# Patient Record
Sex: FEMALE | Race: BLACK OR AFRICAN AMERICAN | NOT HISPANIC OR LATINO | Employment: FULL TIME | URBAN - METROPOLITAN AREA
[De-identification: names, ages, dates, MRNs, and addresses within clinical notes are randomized per-mention and may not be internally consistent; named-entity substitution may affect disease eponyms.]

---

## 2017-01-25 ENCOUNTER — OFFICE VISIT (OUTPATIENT)
Dept: URGENT CARE | Facility: CLINIC | Age: 37
End: 2017-01-25
Payer: COMMERCIAL

## 2017-01-25 VITALS
OXYGEN SATURATION: 98 % | SYSTOLIC BLOOD PRESSURE: 112 MMHG | HEART RATE: 89 BPM | RESPIRATION RATE: 18 BRPM | TEMPERATURE: 99 F | DIASTOLIC BLOOD PRESSURE: 70 MMHG | BODY MASS INDEX: 31.58 KG/M2 | HEIGHT: 67 IN | WEIGHT: 201.19 LBS

## 2017-01-25 DIAGNOSIS — M79.10 MYALGIA: ICD-10-CM

## 2017-01-25 DIAGNOSIS — Z72.0 TOBACCO ABUSE: ICD-10-CM

## 2017-01-25 DIAGNOSIS — J32.9 SINUSITIS, UNSPECIFIED CHRONICITY, UNSPECIFIED LOCATION: Primary | ICD-10-CM

## 2017-01-25 LAB
CTP QC/QA: YES
FLUAV AG NPH QL: NEGATIVE
FLUBV AG NPH QL: NEGATIVE

## 2017-01-25 PROCEDURE — 1159F MED LIST DOCD IN RCRD: CPT | Mod: S$GLB,,, | Performed by: NURSE PRACTITIONER

## 2017-01-25 PROCEDURE — 99214 OFFICE O/P EST MOD 30 MIN: CPT | Mod: S$GLB,,, | Performed by: NURSE PRACTITIONER

## 2017-01-25 PROCEDURE — 87804 INFLUENZA ASSAY W/OPTIC: CPT | Mod: QW,S$GLB,, | Performed by: NURSE PRACTITIONER

## 2017-01-25 PROCEDURE — 99999 PR PBB SHADOW E&M-EST. PATIENT-LVL III: CPT | Mod: PBBFAC,,, | Performed by: NURSE PRACTITIONER

## 2017-01-25 RX ORDER — OXYCODONE AND ACETAMINOPHEN 10; 325 MG/1; MG/1
TABLET ORAL
Refills: 0 | COMMUNITY
Start: 2017-01-19

## 2017-01-25 RX ORDER — DOXYCYCLINE 100 MG/1
100 CAPSULE ORAL EVERY 12 HOURS
Qty: 14 CAPSULE | Refills: 0 | Status: SHIPPED | OUTPATIENT
Start: 2017-01-25 | End: 2017-02-01

## 2017-01-25 RX ORDER — MONTELUKAST SODIUM 10 MG/1
10 TABLET ORAL NIGHTLY
Qty: 30 TABLET | Refills: 0 | Status: SHIPPED | OUTPATIENT
Start: 2017-01-25 | End: 2017-02-24

## 2017-01-25 RX ORDER — FLUTICASONE PROPIONATE 50 MCG
2 SPRAY, SUSPENSION (ML) NASAL DAILY
Qty: 16 G | Refills: 0 | Status: SHIPPED | OUTPATIENT
Start: 2017-01-25 | End: 2017-02-08

## 2017-01-25 NOTE — PROGRESS NOTES
Subjective:       Patient ID: Jeannine Steiner is a 36 y.o. female.    Chief Complaint: Sinus Problem; Cough; and Sore Throat    URI    This is a new problem. The current episode started in the past 7 days. There has been no fever. Associated symptoms include coughing, headaches, rhinorrhea and sinus pain. Pertinent negatives include no chest pain, congestion, ear pain, plugged ear sensation, sneezing, sore throat or wheezing.     Review of Systems   Constitutional: Positive for fatigue. Negative for chills, diaphoresis and fever.   HENT: Positive for rhinorrhea and sinus pressure. Negative for congestion, ear discharge, ear pain, postnasal drip, sneezing and sore throat.    Respiratory: Positive for cough. Negative for shortness of breath and wheezing.    Cardiovascular: Negative for chest pain and palpitations.   Musculoskeletal: Positive for myalgias. Negative for back pain.   Neurological: Positive for headaches.       Objective:      Physical Exam   Constitutional: She is oriented to person, place, and time. She appears well-developed and well-nourished. No distress.   HENT:   Head: Normocephalic.   Right Ear: Tympanic membrane, external ear and ear canal normal.   Left Ear: Tympanic membrane, external ear and ear canal normal.   Nose: Mucosal edema present. No rhinorrhea. Right sinus exhibits maxillary sinus tenderness and frontal sinus tenderness. Left sinus exhibits maxillary sinus tenderness and frontal sinus tenderness.   Mouth/Throat: Uvula is midline, oropharynx is clear and moist and mucous membranes are normal. No oropharyngeal exudate, posterior oropharyngeal edema or posterior oropharyngeal erythema.   Eyes: Conjunctivae and EOM are normal.   Neck: Normal range of motion. Neck supple.   Cardiovascular: Normal rate, regular rhythm and normal heart sounds.    Pulmonary/Chest: Effort normal and breath sounds normal. No accessory muscle usage. No apnea, no tachypnea and no bradypnea. No  respiratory distress. She has no decreased breath sounds. She has no wheezes. She has no rhonchi. She has no rales.   Lymphadenopathy:        Head (right side): No submental, no submandibular and no tonsillar adenopathy present.        Head (left side): No submental, no submandibular and no tonsillar adenopathy present.     She has no cervical adenopathy.   Neurological: She is alert and oriented to person, place, and time.   Skin: Skin is warm and dry. She is not diaphoretic.       Assessment:       1. Sinusitis, unspecified chronicity, unspecified location    2. Myalgia    3. Tobacco abuse        Plan:   Jeannine was seen today for sinus problem, cough and sore throat.    Diagnoses and all orders for this visit:    Sinusitis, unspecified chronicity, unspecified location  Comments:  Try flonase, singulair and mucinex; increase fluids; if no improvement take antibiotic   Orders:  -     fluticasone (FLONASE) 50 mcg/actuation nasal spray; 2 sprays by Each Nare route once daily.  -     doxycycline (VIBRAMYCIN) 100 MG Cap; Take 1 capsule (100 mg total) by mouth every 12 (twelve) hours. Note to Pharmacy: Can substitute for Monodox if needed  -     montelukast (SINGULAIR) 10 mg tablet; Take 1 tablet (10 mg total) by mouth every evening.    Myalgia  -     POCT Influenza A/B    Tobacco abuse         -     Diagnosis, treatment, AVS provided  -     Follow up with PCP or ER immediately for worsening, new or no improvement of symptoms.   -     Patient understands and agrees with plan

## 2017-01-25 NOTE — MR AVS SNAPSHOT
[facility]  [address]  [facility] [address]  Phone: [phone]  Fax: [phone]                  Jeannine Steiner   2017 12:30 PM   Office Visit    Description:  Female : 1980   Provider:  Jovana Mckeon NP   Department:  [facility]           Reason for Visit     Sinus Problem     Cough     Sore Throat           Diagnoses this Visit        Comments    Myalgia    -  Primary     Sinusitis, unspecified chronicity, unspecified location     Try flonase, singulair and mucinex; increase fluids; if no improvement take antibiotic            To Do List           Goals (5 Years of Data)     None       These Medications        Disp Refills Start End    fluticasone (FLONASE) 50 mcg/actuation nasal spray 16 g 0 2017    2 sprays by Each Nare route once daily. - Each Nare    [id number] [address] Ph #: [phone]       doxycycline (VIBRAMYCIN) 100 MG Cap 14 capsule 0 2017    Take 1 capsule (100 mg total) by mouth every 12 (twelve) hours. Note to Pharmacy: Can substitute for Monodox if needed - Oral    Ph[id number] [address] Ph #: [phone]       montelukast (SINGULAIR) 10 mg tablet 30 tablet 0 2017    Take 1 tablet (10 mg total) by mouth every evening. - Oral    [id number] [address] Ph #: [phone]         [facility]     [facility] On Call Nurse Care Line -  Assistance  Registered nurses in the [facility] provide clinical advisement, health education, appointment booking, and other advisory services.  Call for this free service at [phone].             Medications           Message regarding Medications     Verify the changes and/or additions to your medication regime listed below are the same as discussed with  your clinician today.  If any of these changes or additions are incorrect, please notify your healthcare provider.        START taking these NEW medications        Refills    fluticasone (FLONASE) 50 mcg/actuation nasal spray 0    Si sprays by Each Nare route once daily.    Class: Normal    Route: Each Nare    doxycycline (VIBRAMYCIN) 100 MG Cap 0    Sig: Take 1 capsule (100 mg total) by mouth every 12 (twelve) hours. Note to Pharmacy: Can substitute for Monodox if needed    Class: Normal    Route: Oral    montelukast (SINGULAIR) 10 mg tablet 0    Sig: Take 1 tablet (10 mg total) by mouth every evening.    Class: Normal    Route: Oral      STOP taking these medications     hydrocodone-acetaminophen 10-325mg (NORCO)  mg Tab     ondansetron (ZOFRAN) 4 MG tablet Take 1 tablet (4 mg total) by mouth every 8 (eight) hours as needed for Nausea.    valacyclovir (VALTREX) 500 MG tablet Take 1 tablet (500 mg total) by mouth 2 (two) times daily. Til out. Start at first sign of symptoms.    pantoprazole (PROTONIX) 40 MG tablet Take 1 tablet (40 mg total) by mouth once daily.           Verify that the below list of medications is an accurate representation of the medications you are currently taking.  If none reported, the list may be blank. If incorrect, please contact your healthcare provider. Carry this list with you in case of emergency.           Current Medications     amitriptyline (ELAVIL) 50 MG tablet     duloxetine (CYMBALTA) 60 MG capsule     lorazepam (ATIVAN) 1 MG tablet     oxycodone-acetaminophen (PERCOCET)  mg per tablet TK 1 T PO Q 6 H PRN    doxycycline (VIBRAMYCIN) 100 MG Cap Take 1 capsule (100 mg total) by mouth every 12 (twelve) hours. Note to Pharmacy: Can substitute for Monodox if needed    fluticasone (FLONASE) 50 mcg/actuation nasal spray 2 sprays by Each Nare route once daily.    montelukast (SINGULAIR) 10 mg tablet Take 1 tablet (10 mg total) by mouth every evening.           Clinical  "Reference Information           Vital Signs - Last Recorded  Most recent update: 1/25/2017 12:36 PM by Sukhjinder Dailey LPN    BP Pulse Temp Resp    112/70 (BP Location: Right arm, Patient Position: Sitting, BP Method: Manual) 89 98.9 °F (37.2 °C) (Tympanic) 18    Ht Wt SpO2 BMI    5' 6.5" (1.689 m) 91.3 kg (201 lb 2.7 oz) 98% 31.98 kg/m2      Blood Pressure          Most Recent Value    BP  112/70      Allergies as of 1/25/2017     Pcn [Penicillins]      Immunizations Administered on Date of Encounter - 1/25/2017     None      Orders Placed During Today's Visit      Normal Orders This Visit    POCT Influenza A/B       Instructions      Acute Sinusitis    Acute sinusitis is inflammation (irritation and swelling) of the sinuses. It is usually from a bacterial infection that follows an upper respiratory viral infection. Your doctor can help you find relief. Read on to learn more.  What is acute sinusitis?  Sinuses are air-filled spaces in the skull behind the face. They are kept moist and clean by a lining of mucosa. Things such as pollen, smoke, and chemical fumes can irritate the mucosa. It can then become inflamed (swell up). As a response to irritation, the mucosa makes more mucus and other fluids. Tiny hairlike cilia cover the mucosa. Cilia help transport mucus toward the opening of the sinus. Too much mucus may cause the cilia to stop working. This blocks the sinus opening. A buildup of fluid in the sinuses then leads to symptoms such as pain and pressure. It can also encourage growth of bacteria in the sinuses.  Common symptoms of acute sinusitis  You may have:  · Facial soreness pain  · Headache  · Fever  · Postnasal drip (drainage in the back of the throat)  · Congestion  · Drainage that is thick and colored, instead of clear  · Cough  Diagnosis of acute sinusitis  The doctor will ask about your symptoms and medical history. He or she will examine your ear, nose, and throat. X-rays are usually not needed. If " your sinusitis recurs, you may have a culture to check for bacteria or imaging tests.     An evaluation will be done. A culture (sample of mucus) is sometimes taken to check for bacteria. If you have multiple bouts of sinusitis, imaging (X-rays or CAT scans) may be done to check for an anatomic cause of the infection.  Treatment of acute sinusitis  Treatment is designed to unblock the sinus opening and help the cilia work again. Antihistamine and decongestant medications may be prescribed. These can reduce inflammation and decrease fluid production. If a bacterial infection is present, it is treated with antibiotic medication for 10 to 14 days. This medication should be taken until it is gone, even if you feel better.  © 9168-7083 [employer]. [address] [place] [address], [place] [address]. All rights reserved. This information is not intended as a substitute for professional medical care. Always follow your healthcare professional's instructions.             Smoking Cessation     If you would like to quit smoking:   You may be eligible for free services if you are a [place] resident and started smoking cigarettes before September 1, 1988.  Call the Smoking Cessation Trust (SCT) toll free at (020) 254-5325 or (426) 414-8785.   Call 6-707-QUIT-NOW if you do not meet the above criteria.

## 2017-01-25 NOTE — LETTER
January 25, 2017      [facility]  [address]  [facility] [address]  Phone: [phone]  Fax: [phone]       Patient: James Steiner   YOB: 1980  Date of Visit: 01/25/2017    To Whom It May Concern:    James was at [facility] on 01/25/2017. She may return to work on 1/27/17 with no restrictions. If you have any questions or concerns, or if I can be of further assistance, please do not hesitate to contact me.    Sincerely,    Jovana Mckeon NP

## 2017-01-25 NOTE — PATIENT INSTRUCTIONS
Acute Sinusitis    Acute sinusitis is inflammation (irritation and swelling) of the sinuses. It is usually from a bacterial infection that follows an upper respiratory viral infection. Your doctor can help you find relief. Read on to learn more.  What is acute sinusitis?  Sinuses are air-filled spaces in the skull behind the face. They are kept moist and clean by a lining of mucosa. Things such as pollen, smoke, and chemical fumes can irritate the mucosa. It can then become inflamed (swell up). As a response to irritation, the mucosa makes more mucus and other fluids. Tiny hairlike cilia cover the mucosa. Cilia help transport mucus toward the opening of the sinus. Too much mucus may cause the cilia to stop working. This blocks the sinus opening. A buildup of fluid in the sinuses then leads to symptoms such as pain and pressure. It can also encourage growth of bacteria in the sinuses.  Common symptoms of acute sinusitis  You may have:  · Facial soreness pain  · Headache  · Fever  · Postnasal drip (drainage in the back of the throat)  · Congestion  · Drainage that is thick and colored, instead of clear  · Cough  Diagnosis of acute sinusitis  The doctor will ask about your symptoms and medical history. He or she will examine your ear, nose, and throat. X-rays are usually not needed. If your sinusitis recurs, you may have a culture to check for bacteria or imaging tests.     An evaluation will be done. A culture (sample of mucus) is sometimes taken to check for bacteria. If you have multiple bouts of sinusitis, imaging (X-rays or CAT scans) may be done to check for an anatomic cause of the infection.  Treatment of acute sinusitis  Treatment is designed to unblock the sinus opening and help the cilia work again. Antihistamine and decongestant medications may be prescribed. These can reduce inflammation and decrease fluid production. If a bacterial infection is present, it is treated with antibiotic medication for 10 to  14 days. This medication should be taken until it is gone, even if you feel better.  © 8082-7900 [employer]. [address] [place] [address], [place] [address]. All rights reserved. This information is not intended as a substitute for professional medical care. Always follow your healthcare professional's instructions.

## 2017-03-23 RX ORDER — VALACYCLOVIR HYDROCHLORIDE 500 MG/1
500 TABLET, FILM COATED ORAL 2 TIMES DAILY
Qty: 6 TABLET | Refills: 0 | Status: SHIPPED | OUTPATIENT
Start: 2017-03-23 | End: 2017-06-13 | Stop reason: SDUPTHER

## 2017-05-04 ENCOUNTER — PATIENT OUTREACH (OUTPATIENT)
Dept: ADMINISTRATIVE | Facility: HOSPITAL | Age: 37
End: 2017-05-04

## 2017-06-05 ENCOUNTER — OFFICE VISIT (OUTPATIENT)
Dept: FAMILY MEDICINE | Facility: CLINIC | Age: 37
End: 2017-06-05
Payer: COMMERCIAL

## 2017-06-05 VITALS
OXYGEN SATURATION: 98 % | TEMPERATURE: 99 F | HEART RATE: 91 BPM | WEIGHT: 205 LBS | DIASTOLIC BLOOD PRESSURE: 70 MMHG | HEIGHT: 66 IN | BODY MASS INDEX: 32.95 KG/M2 | SYSTOLIC BLOOD PRESSURE: 114 MMHG

## 2017-06-05 DIAGNOSIS — M25.50 ARTHRALGIA, UNSPECIFIED JOINT: Primary | ICD-10-CM

## 2017-06-05 LAB
CTP QC/QA: YES
FLUAV AG NPH QL: NEGATIVE
FLUBV AG NPH QL: NEGATIVE

## 2017-06-05 PROCEDURE — 96372 THER/PROPH/DIAG INJ SC/IM: CPT | Mod: PBBFAC,PO

## 2017-06-05 PROCEDURE — 99214 OFFICE O/P EST MOD 30 MIN: CPT | Mod: S$PBB,,, | Performed by: FAMILY MEDICINE

## 2017-06-05 PROCEDURE — 87804 INFLUENZA ASSAY W/OPTIC: CPT | Mod: PBBFAC,PO | Performed by: FAMILY MEDICINE

## 2017-06-05 PROCEDURE — 99999 PR PBB SHADOW E&M-EST. PATIENT-LVL III: CPT | Mod: PBBFAC,,, | Performed by: FAMILY MEDICINE

## 2017-06-05 RX ORDER — KETOROLAC TROMETHAMINE 30 MG/ML
60 INJECTION, SOLUTION INTRAMUSCULAR; INTRAVENOUS
Status: COMPLETED | OUTPATIENT
Start: 2017-06-05 | End: 2017-06-05

## 2017-06-05 RX ADMIN — KETOROLAC TROMETHAMINE 60 MG: 60 INJECTION, SOLUTION INTRAMUSCULAR at 11:06

## 2017-06-05 NOTE — PATIENT INSTRUCTIONS
Arthralgia    Arthralgia is the term for pain in or around the joint. It is a symptom, not a disease. This pain may involve one or more joints. In some cases, the pain moves from joint to joint.  There are many causes for joint pain. These include:  · Injury  · Osteoarthritis (wearing out of the joint surface)  · Gout (inflammation of the joint due to crystals in the joint fluid)  · Infection inside the joint    · Bursitis (inflammation of the fluid-filled sacs around the joint)  · Autoimmune disorders such as rheumatoid arthritis or lupus  · Tendonitis (inflamation of chords that attach muscle to bone)  Home care  · Rest the involved joint(s) until your symptoms improve.   · You may be prescribed pain medication. If none is prescribed, you may use acetaminophen or ibuprofen to control pain and inflammation.  Follow up  Follow up with your healthcare provider or our staff as advised.  When to seek medical care  Contact your healthcare provider right away if any of the following occurs:  · Pain, swelling, or redness of joint increases  · Pain worsens or recurs after a period of improvement  · Pain moves to other joints  · You cannot bear weight on the affected joint   · You cannot move the affected joint  · Joint appears deformed  · New rash appears  · Fever of 101ºF (38.8ºC) or higher, or as directed by your healthcare provider  Date Last Reviewed: 4/26/2015  © 0264-8910 [employer]. [address] [place] [address], [place] [address]. All rights reserved. This information is not intended as a substitute for professional medical care. Always follow your healthcare professional's instructions.

## 2017-06-05 NOTE — PROGRESS NOTES
Subjective:       Patient ID: Jeannine Steiner is a 37 y.o. female.    Chief Complaint: Shoulder Pain (left shoulder for 2 days)      HPI Comments:     She woke up yesterday morning hurting all over in the chest. She took 2 Percocets which she has chronically for back and hip pain.  Later in the day,  she felt the pain was returning and was localized to her shoulders both sides but left greater than right, her elbows bilaterally and her knees bilaterally left greater than right.  No other systemic symptoms other than loss of appetite and poor sleeping which always happens when she is in pain.  She had not been exerting herself previous to the onset of her symptoms and has never had this kind of presentation before. No flu exp. No URI symptoms.       Review of Systems   Constitutional: Positive for activity change, appetite change and fatigue. Negative for chills, diaphoresis and fever.   HENT: Negative for congestion and sore throat.    Respiratory: Negative for cough, shortness of breath and wheezing.    Cardiovascular: Negative for chest pain.   Gastrointestinal: Positive for nausea. Negative for abdominal pain, constipation and diarrhea.   Genitourinary: Negative for difficulty urinating and dysuria.   Musculoskeletal: Positive for arthralgias and myalgias. Negative for joint swelling.   Skin: Negative for rash.   Neurological: Negative for headaches.   Psychiatric/Behavioral: Negative for confusion.       Objective:      Physical Exam   Constitutional: She is oriented to person, place, and time. Vital signs are normal. She appears well-developed and well-nourished.  Non-toxic appearance. She does not have a sickly appearance. No distress.   Appears very stiff diffusely.   HENT:   Head: Normocephalic.   Right Ear: External ear normal.   Left Ear: External ear normal.   Mouth/Throat: Oropharynx is clear and moist. No oropharyngeal exudate.   Eyes: Conjunctivae are normal. Right eye exhibits no discharge.  Left eye exhibits no discharge. No scleral icterus.   Neck: Normal range of motion. Neck supple. No JVD present. No thyromegaly present.   Cardiovascular: Normal rate, regular rhythm, normal heart sounds and intact distal pulses.  Exam reveals no gallop and no friction rub.    No murmur heard.  Pulmonary/Chest: Effort normal and breath sounds normal. No respiratory distress. She has no wheezes. She has no rales.   Abdominal: Soft. She exhibits no distension and no mass. There is no tenderness. There is no guarding.   Musculoskeletal: She exhibits no edema.        Left shoulder: She exhibits decreased range of motion, tenderness, bony tenderness and pain.        Left knee: She exhibits decreased range of motion. She exhibits no swelling, no effusion, no erythema, no LCL laxity, normal patellar mobility, no bony tenderness and no MCL laxity. Tenderness found. Medial joint line and lateral joint line tenderness noted. No MCL, no LCL and no patellar tendon tenderness noted.        Arms:  Lymphadenopathy:     She has no cervical adenopathy.   Neurological: She is alert and oriented to person, place, and time.   Skin: Skin is warm and dry. No rash noted. She is not diaphoretic.   Psychiatric: She has a normal mood and affect. Her behavior is normal. Judgment and thought content normal.   Nursing note and vitals reviewed.      Assessment:       1. Arthralgia, unspecified joint        Plan:   Arthralgia, unspecified joint  Comments:  Acute, diffuse. Shoulders and knees, L>R; elbows bilat. flu neg. Toradol shot. F/u 1 week if still symptomatic.  Orders:  -     POCT Influenza A/B  -     ketorolac injection 60 mg; Inject 2 mLs (60 mg total) into the muscle one time.

## 2017-06-05 NOTE — LETTER
June 5, 2017      [facility]  [facility] [address]  Phone: [phone]  Fax: [phone]       Patient: Jeannine Steiner   YOB: 1980  Date of Visit: 06/05/2017    To Whom It May Concern:    Jeannine  was seen today at [facility] on 06/05/2017.  Please excuse patient June 5 thru June 7,2017 returning to work June 8, 2017.   . If you have any questions or concerns, or if I can be of further assistance, please do not hesitate to contact me.    Sincerely,            Dr Karma Hall

## 2017-06-05 NOTE — PROGRESS NOTES
Allergies reviewed with patient  Toradol given im left Ventrogluteal.  Pt instructed to wait 15 min for josiane Santo LPN

## 2017-06-13 ENCOUNTER — OFFICE VISIT (OUTPATIENT)
Dept: FAMILY MEDICINE | Facility: CLINIC | Age: 37
End: 2017-06-13
Payer: COMMERCIAL

## 2017-06-13 ENCOUNTER — HOSPITAL ENCOUNTER (OUTPATIENT)
Dept: RADIOLOGY | Facility: HOSPITAL | Age: 37
Discharge: HOME | End: 2017-06-13
Attending: FAMILY MEDICINE
Payer: COMMERCIAL

## 2017-06-13 VITALS
DIASTOLIC BLOOD PRESSURE: 82 MMHG | OXYGEN SATURATION: 98 % | HEART RATE: 92 BPM | WEIGHT: 206 LBS | BODY MASS INDEX: 33.11 KG/M2 | HEIGHT: 66 IN | SYSTOLIC BLOOD PRESSURE: 120 MMHG | TEMPERATURE: 99 F

## 2017-06-13 DIAGNOSIS — G89.29 CHRONIC LOW BACK PAIN WITHOUT SCIATICA, UNSPECIFIED BACK PAIN LATERALITY: ICD-10-CM

## 2017-06-13 DIAGNOSIS — M25.50 ARTHRALGIA, UNSPECIFIED JOINT: Primary | ICD-10-CM

## 2017-06-13 DIAGNOSIS — Z86.19 HISTORY OF HERPES GENITALIS: ICD-10-CM

## 2017-06-13 DIAGNOSIS — Z87.39 PERSONAL HISTORY OF SLIPPED CAPITAL FEMORAL EPIPHYSIS (SCFE): ICD-10-CM

## 2017-06-13 DIAGNOSIS — M54.50 CHRONIC LOW BACK PAIN WITHOUT SCIATICA, UNSPECIFIED BACK PAIN LATERALITY: ICD-10-CM

## 2017-06-13 DIAGNOSIS — M25.50 ARTHRALGIA, UNSPECIFIED JOINT: ICD-10-CM

## 2017-06-13 PROBLEM — M54.9 CHRONIC BACK PAIN: Status: ACTIVE | Noted: 2017-06-13

## 2017-06-13 PROCEDURE — 73030 X-RAY EXAM OF SHOULDER: CPT | Mod: 26,50,, | Performed by: RADIOLOGY

## 2017-06-13 PROCEDURE — 73030 X-RAY EXAM OF SHOULDER: CPT | Mod: TC,50,PO

## 2017-06-13 PROCEDURE — 99999 PR PBB SHADOW E&M-EST. PATIENT-LVL III: CPT | Mod: PBBFAC,,, | Performed by: FAMILY MEDICINE

## 2017-06-13 PROCEDURE — 73562 X-RAY EXAM OF KNEE 3: CPT | Mod: TC,50,PO

## 2017-06-13 PROCEDURE — 99214 OFFICE O/P EST MOD 30 MIN: CPT | Mod: S$GLB,,, | Performed by: FAMILY MEDICINE

## 2017-06-13 PROCEDURE — 73562 X-RAY EXAM OF KNEE 3: CPT | Mod: 26,50,, | Performed by: RADIOLOGY

## 2017-06-13 RX ORDER — VALACYCLOVIR HYDROCHLORIDE 500 MG/1
500 TABLET, FILM COATED ORAL DAILY
Qty: 30 TABLET | Refills: 2 | Status: SHIPPED | OUTPATIENT
Start: 2017-06-13 | End: 2017-11-15 | Stop reason: SDUPTHER

## 2017-06-13 RX ORDER — DULOXETIN HYDROCHLORIDE 30 MG/1
30 CAPSULE, DELAYED RELEASE ORAL DAILY
COMMUNITY
Start: 2017-05-26 | End: 2017-11-27

## 2017-06-13 NOTE — PROGRESS NOTES
Subjective:       Patient ID: Jeannine Steiner is a 37 y.o. female.    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   She has also note Her L shoulder has been swollen ,  No rashes  + anorexia .Take NSAID with some relief .  See Dr Christine for PM and Dr Coronel for depression and anxiety .She also would like rf on valtrex , she gets sore in genitals at   Least 2 times per  month , exposed to GH with previous relationship       Nausea   Associated symptoms include arthralgias, fatigue, joint swelling and nausea.   Fatigue   Associated symptoms include arthralgias, fatigue, joint swelling and nausea.     Review of Systems   Constitutional: Positive for fatigue.   Respiratory: Negative.    Cardiovascular: Negative.    Gastrointestinal: Positive for nausea.   Musculoskeletal: Positive for arthralgias and joint swelling.       Objective:      Physical Exam   Constitutional: She is oriented to person, place, and time. She appears well-developed and well-nourished. No distress.   HENT:   Head: Normocephalic and atraumatic.   Right Ear: External ear normal.   Left Ear: External ear normal.   Mouth/Throat: No oropharyngeal exudate.   Eyes: Conjunctivae and EOM are normal. Pupils are equal, round, and reactive to light. Right eye exhibits no discharge. Left eye exhibits no discharge. No scleral icterus.   Neck: Normal range of motion. Neck supple. No JVD present. No tracheal deviation present. No thyromegaly present.   Cardiovascular: Normal rate, regular rhythm and normal heart sounds.  Exam reveals no gallop and no friction rub.    No murmur heard.  Pulmonary/Chest: Effort normal and breath sounds normal. No stridor. No respiratory distress. She has no wheezes. She has no rales. She exhibits no  tenderness.   Abdominal: Soft. Bowel sounds are normal. She exhibits no distension. There is no tenderness. There is no rebound and no guarding.   Musculoskeletal:        Right shoulder: She exhibits decreased range of motion and tenderness.        Left shoulder: She exhibits decreased range of motion and tenderness.        Right hip: She exhibits decreased range of motion and tenderness.        Left hip: She exhibits decreased range of motion, decreased strength and tenderness.        Right knee: She exhibits decreased range of motion. Tenderness found. Medial joint line and lateral joint line tenderness noted.        Left knee: She exhibits decreased range of motion. Tenderness found. Medial joint line and lateral joint line tenderness noted.   Lymphadenopathy:     She has no cervical adenopathy.   Neurological: She is alert and oriented to person, place, and time.   Skin: Skin is warm and dry. She is not diaphoretic.   Psychiatric: She has a normal mood and affect. Her behavior is normal. Judgment and thought content normal.       Assessment:       Jeannine was seen today for nausea, fatigue, anorexia and generalized body aches.    Diagnoses and all orders for this visit:    Arthralgia, unspecified joint  -     CBC auto differential; Future  -     Comprehensive metabolic panel; Future  -     ADELSO; Future  -     Rheumatoid factor; Future  -     CYCLIC CITRUL PEPTIDE ANTIBODY, IGG; Future  -     Sedimentation rate, manual; Future  -     C-reactive protein; Future  -     X-Ray Shoulder 2 or More views Bilateral; Future  -     X-Ray Knee 1 or 2 View Bilateral; Future    Chronic low back pain without sciatica, unspecified back pain laterality    Personal history of slipped capital femoral epiphysis (SCFE)    History of herpes genitalis    Other orders  -     valacyclovir (VALTREX) 500 MG tablet; Take 1 tablet (500 mg total) by mouth once daily.      Plan:     Jeannine was seen today for nausea, fatigue, anorexia and  generalized body aches.    Diagnoses and all orders for this visit:    Chronic low back pain without sciatica, unspecified back pain laterality     Get labs   Will start suppressive regimen . reassess in 3 m

## 2017-06-19 ENCOUNTER — TELEPHONE (OUTPATIENT)
Dept: FAMILY MEDICINE | Facility: CLINIC | Age: 37
End: 2017-06-19

## 2017-06-19 NOTE — TELEPHONE ENCOUNTER
----- Message from Lissa Villareal sent at 6/19/2017  2:43 PM CDT -----  Contact: pt  Pt is calling Nurse staff regarding lab results . Pt call back [phone] thanks

## 2017-06-19 NOTE — TELEPHONE ENCOUNTER
----- Message from Leena De La Cruz sent at 6/19/2017  2:49 PM CDT -----  6/13 results needed...[phone] ([place])

## 2017-06-20 ENCOUNTER — TELEPHONE (OUTPATIENT)
Dept: FAMILY MEDICINE | Facility: CLINIC | Age: 37
End: 2017-06-20

## 2017-06-20 NOTE — TELEPHONE ENCOUNTER
----- Message from Naty Iyer sent at 6/20/2017 11:44 AM CDT -----  Contact: pt  Pt states had blood work done last week,returning phone call from yesterday concerning the results...[phone] (home)

## 2017-06-20 NOTE — TELEPHONE ENCOUNTER
Patient stopped by office and call handled at that time. Results were given to patient patient verbalized understanding and scheduled appt to see the doctor for further testing.

## 2017-11-15 RX ORDER — VALACYCLOVIR HYDROCHLORIDE 500 MG/1
500 TABLET, FILM COATED ORAL DAILY
Qty: 30 TABLET | Refills: 2 | Status: SHIPPED | OUTPATIENT
Start: 2017-11-15 | End: 2018-07-11 | Stop reason: SDUPTHER

## 2017-11-15 RX ORDER — DULOXETIN HYDROCHLORIDE 60 MG/1
60 CAPSULE, DELAYED RELEASE ORAL DAILY
Qty: 30 CAPSULE | Refills: 0 | Status: SHIPPED | OUTPATIENT
Start: 2017-11-15 | End: 2018-03-22

## 2017-11-15 RX ORDER — LORAZEPAM 1 MG/1
1 TABLET ORAL 2 TIMES DAILY
Refills: 0 | OUTPATIENT
Start: 2017-11-15

## 2017-11-15 NOTE — TELEPHONE ENCOUNTER
Pt would like medication sent to [facility] on [address]. She has an appt with you on 11/27/17.

## 2017-11-27 ENCOUNTER — OFFICE VISIT (OUTPATIENT)
Dept: FAMILY MEDICINE | Facility: CLINIC | Age: 37
End: 2017-11-27
Payer: COMMERCIAL

## 2017-11-27 VITALS
HEART RATE: 91 BPM | SYSTOLIC BLOOD PRESSURE: 110 MMHG | BODY MASS INDEX: 33.7 KG/M2 | OXYGEN SATURATION: 98 % | WEIGHT: 209.69 LBS | DIASTOLIC BLOOD PRESSURE: 74 MMHG | HEIGHT: 66 IN | TEMPERATURE: 98 F

## 2017-11-27 DIAGNOSIS — H91.90 HEARING LOSS, UNSPECIFIED HEARING LOSS TYPE, UNSPECIFIED LATERALITY: ICD-10-CM

## 2017-11-27 DIAGNOSIS — A60.00 GENITAL HERPES SIMPLEX, UNSPECIFIED SITE: ICD-10-CM

## 2017-11-27 DIAGNOSIS — F40.10 SOCIAL ANXIETY DISORDER: Primary | ICD-10-CM

## 2017-11-27 DIAGNOSIS — R52 BODY ACHES: ICD-10-CM

## 2017-11-27 LAB
CTP QC/QA: YES
FLUAV AG NPH QL: NEGATIVE
FLUBV AG NPH QL: NEGATIVE

## 2017-11-27 PROCEDURE — 99214 OFFICE O/P EST MOD 30 MIN: CPT | Mod: S$GLB,,, | Performed by: FAMILY MEDICINE

## 2017-11-27 PROCEDURE — 99999 PR PBB SHADOW E&M-EST. PATIENT-LVL IV: CPT | Mod: PBBFAC,,, | Performed by: FAMILY MEDICINE

## 2017-11-27 PROCEDURE — 87804 INFLUENZA ASSAY W/OPTIC: CPT | Mod: QW,S$GLB,, | Performed by: FAMILY MEDICINE

## 2017-11-27 RX ORDER — LORAZEPAM 1 MG/1
1 TABLET ORAL 2 TIMES DAILY
Qty: 60 TABLET | Refills: 0 | Status: SHIPPED | OUTPATIENT
Start: 2017-11-27 | End: 2018-01-19 | Stop reason: SDUPTHER

## 2017-11-27 NOTE — LETTER
November 27, 2017      [facility]  [address] [address]  Phone: [phone]  Fax: [phone]       Patient: Jeannine Steiner   YOB: 1980  Date of Visit: 11/27/2017    To Whom It May Concern:      Annette Steiner  was at [facility] on 11/27/2017. She may return to work/school on 11/28/2017 with no restrictions. If you have any questions or concerns, or if I can be of further assistance, please do not hesitate to contact me.    Sincerely,          Monisha Figueredo LPN

## 2017-11-27 NOTE — PROGRESS NOTES
Subjective:       Patient ID: Jeannine Steiner is a 37 y.o. female.    Chief Complaint: Hearing Loss; Medication Refill; and Generalized Body Aches    37 y old female with recurrent genital herpes., Social anxiety disorder here for f.u . Doing great  Aside from hearing  loss for months (r ear ) . No loud noise exposure, no tinnitus . She has not had any herpes outbreaks since being on daily valtrex / Previously seeing Dr Coronel for depression but she was charged  a NS fee that she could not afford so she stopped seeing him. Non suicidal . Anxious mainly when dealing with crowds      Hearing Loss: No ear pain.  Medication Refill       Review of Systems   Constitutional: Negative.    HENT: Positive for hearing loss. Negative for ear discharge and ear pain.    Cardiovascular: Negative.    Gastrointestinal: Negative.    Genitourinary: Negative.    Musculoskeletal: Negative.    Neurological: Negative.    Hematological: Negative.    Psychiatric/Behavioral: Negative.        Objective:      Physical Exam   Constitutional: She is oriented to person, place, and time. She appears well-developed and well-nourished. No distress.   HENT:   Head: Normocephalic and atraumatic.   Right Ear: External ear normal.   Left Ear: External ear normal.   Mouth/Throat: No oropharyngeal exudate.   Eyes: Conjunctivae and EOM are normal. Pupils are equal, round, and reactive to light. Right eye exhibits no discharge. Left eye exhibits no discharge. No scleral icterus.   Neck: Normal range of motion. Neck supple. No JVD present. No tracheal deviation present. No thyromegaly present.   Cardiovascular: Normal rate, regular rhythm and normal heart sounds.  Exam reveals no gallop and no friction rub.    No murmur heard.  Pulmonary/Chest: Effort normal and breath sounds normal. No stridor. No respiratory distress. She has no wheezes. She has no rales. She exhibits no tenderness.   Abdominal: Soft. Bowel sounds are normal. She exhibits no  distension. There is no tenderness. There is no rebound and no guarding.   Musculoskeletal: Normal range of motion.   Lymphadenopathy:     She has no cervical adenopathy.   Neurological: She is alert and oriented to person, place, and time.   Skin: Skin is warm and dry. She is not diaphoretic.   Psychiatric: She has a normal mood and affect. Her behavior is normal. Judgment and thought content normal.       Assessment:         Jeannine was seen today for hearing loss, medication refill and generalized body aches.    Diagnoses and all orders for this visit:    Social anxiety disorder  -     Ambulatory consult to Psychiatry    Hearing loss, unspecified hearing loss type, unspecified laterality  -     Ambulatory consult to ENT  -     Basic comp. Audiometry; Future    Genital herpes simplex, unspecified site    Other orders  -     LORazepam (ATIVAN) 1 MG tablet; Take 1 tablet (1 mg total) by mouth 2 (two) times daily.      Plan:     Jeannine was seen today for hearing loss, medication refill and generalized body aches.    Diagnoses and all orders for this visit:    Social anxiety disorder  -     Ambulatory consult to Psychiatry    Hearing loss, unspecified hearing loss type, unspecified laterality  -     Ambulatory consult to ENT  -     Basic comp. Audiometry; Future    Other orders  -     LORazepam (ATIVAN) 1 MG tablet; Take 1 tablet (1 mg total) by mouth 2 (two) times daily.     See PSych    Reviewed. Appropriated.Made  Aware of dangers of mixing benzo and opioids,. Will sign CSC and will f.u with Psych   See ENT   Stable. Cont med

## 2017-12-11 ENCOUNTER — OFFICE VISIT (OUTPATIENT)
Dept: FAMILY MEDICINE | Facility: CLINIC | Age: 37
End: 2017-12-11
Payer: COMMERCIAL

## 2017-12-11 VITALS
WEIGHT: 211.19 LBS | DIASTOLIC BLOOD PRESSURE: 79 MMHG | HEART RATE: 79 BPM | OXYGEN SATURATION: 98 % | RESPIRATION RATE: 18 BRPM | TEMPERATURE: 98 F | BODY MASS INDEX: 33.94 KG/M2 | HEIGHT: 66 IN | SYSTOLIC BLOOD PRESSURE: 121 MMHG

## 2017-12-11 DIAGNOSIS — S61.012A LACERATION OF LEFT THUMB WITHOUT FOREIGN BODY WITHOUT DAMAGE TO NAIL, INITIAL ENCOUNTER: Primary | ICD-10-CM

## 2017-12-11 PROCEDURE — 12002 RPR S/N/AX/GEN/TRNK2.6-7.5CM: CPT | Mod: S$GLB,,, | Performed by: NURSE PRACTITIONER

## 2017-12-11 PROCEDURE — 99213 OFFICE O/P EST LOW 20 MIN: CPT | Mod: 25,S$GLB,, | Performed by: NURSE PRACTITIONER

## 2017-12-11 PROCEDURE — 99999 PR PBB SHADOW E&M-EST. PATIENT-LVL III: CPT | Mod: PBBFAC,,, | Performed by: NURSE PRACTITIONER

## 2017-12-11 NOTE — PROGRESS NOTES
Subjective:       Patient ID: Jeannine Steiner is a 37 y.o. female.    Chief Complaint: Laceration (left thumb)  pt reports to clinic with chief complaint of laceration to left thumb.  Occurred on yesterday with household kitchen knife.  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  Last tetanus  Per [facility] 2012;  HPI  Review of Systems   Constitutional: Negative for chills and fever.   Musculoskeletal: Positive for arthralgias and myalgias.   Skin: Positive for wound.       Objective:      Physical Exam   Constitutional: She appears well-developed and well-nourished.   HENT:   Head: Normocephalic.   Eyes: EOM are normal.   Musculoskeletal:        Left hand: She exhibits laceration. She exhibits normal range of motion and no swelling.        Hands:  Vitals reviewed.      Assessment:       1. Laceration of left thumb without foreign body without damage to nail, initial encounter        Plan:   Laceration of left thumb without foreign body without damage to nail, initial encounter  -wound cleansed, skin glue used.  Wound edges are well approx. Pt tolerated well. Home care instructions given.  Pt verbalized understanding    No Follow-up on file.

## 2017-12-11 NOTE — PATIENT INSTRUCTIONS
Extremity Laceration with Skin Glue (Child)  A laceration is a cut. Your child has a cut on his or her arm or leg. Your childs cut was closed with skin glue. In some cases, a lower layer of skin may be sutured before skin glue is put on. The skin glue closes the cut within a few minutes. It also provides a water-resistant cover. No bandage is needed. Skin glue peels off on its own within 5 to 10 days. Most skin wounds heal within 10 days.  Your child may also need a tetanus shot. This is given if your child has no record of a shot, and the object that caused the cut may lead to tetanus.  Home care  Your childs health care provider may prescribe an oral antibiotic. This is to help prevent infection. Follow all instructions for giving this medicine to your child. Make sure your child takes the medicine every day until it is gone. You should not have any left over.  If your child has pain, you can give him or her pain medicine as advised by your childs healthcare provider. Do not give your child aspirin. It can cause rare but very serious problems in children 15 years of age and younger. Dont give your child any other medicine without first asking the healthcare provider.  General care  · Follow the health care providers instructions on how to care for the cut.  · Wash your hands with soap and warm water before and after caring for your child. This is to help prevent infection.  · Have your child avoid activities that may reopen the wound.  · Dont put liquid, ointment, or cream on the wound while the glue is in place.   · Make sure your child does not scratch, rub, or pick at the area. A baby may need to wear scratch mittens.  · Avoid soaking the cut in water. Have your child shower or take sponge baths instead of tub baths. Dont let your child go swimming.  · If the area gets wet, gently pat it dry with a clean cloth.  · Most skin wounds heal without problems. However, an infection sometimes occurs despite  proper treatment. Therefore, watch for the signs of infection listed below.  Follow-up care  Follow up with your childs healthcare provider as advised. The glue should peel off within 5 to 10 days. If it has not fallen off after 10 days, you can take it off yourself. Put mineral oil or petroleum jelly on a cotton ball and gently rub the glue until it is removed.  Special note to parents  Health care providers are trained to see injuries such as this in young children as a sign of possible abuse. You may be asked questions about how your child was injured. Healthcare providers are required by law to ask you these questions. This is done to protect your child. Please try to be patient.  When to seek medical advice  · Wound bleeding not controlled by direct pressure  · Signs of infection, including wound redness or swelling getting worse or pus or bad odor coming from the wound  · Pain gets worse (Infants may show pain as crying or fussing that cant be soothed)  · Fever of 100.4°F (38ºC) or higher, or as directed by the child's healthcare provider  · Wound edges re-open  · Wound changes colors  · Numbness around the wound   · Decreased movement around the injured area  Date Last Reviewed: 6/14/2015  © 9863-2241 [employer]. [address] [place] [address], [place] [address]. All rights reserved. This information is not intended as a substitute for professional medical care. Always follow your healthcare professional's instructions.

## 2018-01-19 RX ORDER — LORAZEPAM 1 MG/1
1 TABLET ORAL 2 TIMES DAILY
Qty: 60 TABLET | Refills: 0 | Status: SHIPPED | OUTPATIENT
Start: 2018-01-19 | End: 2018-02-23 | Stop reason: SDUPTHER

## 2018-01-19 NOTE — TELEPHONE ENCOUNTER
Last office visit 11/27/2017. Last refill date 12/08/2017. Pt is requesting a refill on lorazepam 1 mg #60 bid

## 2018-02-23 RX ORDER — LORAZEPAM 1 MG/1
1 TABLET ORAL 2 TIMES DAILY
Qty: 60 TABLET | Refills: 0 | Status: SHIPPED | OUTPATIENT
Start: 2018-02-23 | End: 2018-04-16 | Stop reason: SDUPTHER

## 2018-03-07 ENCOUNTER — OFFICE VISIT (OUTPATIENT)
Dept: OTOLARYNGOLOGY | Facility: CLINIC | Age: 38
End: 2018-03-07
Payer: COMMERCIAL

## 2018-03-07 ENCOUNTER — CLINICAL SUPPORT (OUTPATIENT)
Dept: AUDIOLOGY | Facility: CLINIC | Age: 38
End: 2018-03-07
Payer: COMMERCIAL

## 2018-03-07 ENCOUNTER — PATIENT MESSAGE (OUTPATIENT)
Dept: OTOLARYNGOLOGY | Facility: CLINIC | Age: 38
End: 2018-03-07

## 2018-03-07 VITALS
BODY MASS INDEX: 32.49 KG/M2 | SYSTOLIC BLOOD PRESSURE: 124 MMHG | WEIGHT: 207 LBS | TEMPERATURE: 98 F | DIASTOLIC BLOOD PRESSURE: 78 MMHG | HEART RATE: 78 BPM | HEIGHT: 67 IN

## 2018-03-07 DIAGNOSIS — H90.42 SENSORINEURAL HEARING LOSS (SNHL) OF LEFT EAR WITH UNRESTRICTED HEARING OF RIGHT EAR: Primary | ICD-10-CM

## 2018-03-07 PROCEDURE — 99999 PR PBB SHADOW E&M-EST. PATIENT-LVL IV: CPT | Mod: PBBFAC,,, | Performed by: PHYSICIAN ASSISTANT

## 2018-03-07 PROCEDURE — 92557 COMPREHENSIVE HEARING TEST: CPT | Mod: S$GLB,,, | Performed by: AUDIOLOGIST-HEARING AID FITTER

## 2018-03-07 PROCEDURE — 92567 TYMPANOMETRY: CPT | Mod: S$GLB,,, | Performed by: AUDIOLOGIST-HEARING AID FITTER

## 2018-03-07 PROCEDURE — 99204 OFFICE O/P NEW MOD 45 MIN: CPT | Mod: S$GLB,,, | Performed by: PHYSICIAN ASSISTANT

## 2018-03-07 NOTE — PROGRESS NOTES
Subjective:       Patient ID: Jeannine Steiner is a 37 y.o. female.    Chief Complaint: Hearing Loss (review audio today )    Patient is a very pleasant 37 year old female here to see me today for the first time for evaluation of progressive hearing loss in her left ear over the past 2 years, more rapid decline over the past one year.  She denies tinnitus or dizziness.  Denies family history of hearing loss;  Denies significant loud noise exposure history; no history of ear infections or previous otologic surgery surgery.  She was diagnosed with herpes simplex virus about two years ago, and is medically managed.  Denies ear pain or drainage.  She smokes 1 pack per day for past 20 years.  She sometimes suffers with allergy symptoms (congestion and runny nose) and uses Flonase PRN.      Review of Systems   Constitutional: Negative for activity change, appetite change and unexpected weight change.   HENT: Negative for congestion, ear discharge, nosebleeds, postnasal drip, sinus pain, sinus pressure, sore throat and trouble swallowing.    Eyes: Negative for discharge.   Respiratory: Negative for cough and shortness of breath.         Hx of asthma   Cardiovascular: Negative for chest pain and palpitations.   Gastrointestinal: Negative for diarrhea, nausea and vomiting.   Musculoskeletal: Positive for arthralgias and back pain.   Allergic/Immunologic: Negative for food allergies.   Neurological: Negative for light-headedness.   Hematological: Negative for adenopathy.   Psychiatric/Behavioral: Positive for sleep disturbance.       Objective:      Physical Exam   Constitutional: She is oriented to person, place, and time. She appears well-developed and well-nourished. She is cooperative. No distress.   HENT:   Head: Normocephalic and atraumatic.   Right Ear: Tympanic membrane, external ear and ear canal normal. No middle ear effusion.   Left Ear: Tympanic membrane, external ear and ear canal normal.  No middle ear  effusion.   Nose: Nose normal. No mucosal edema, rhinorrhea, nasal deformity or septal deviation. No epistaxis. Right sinus exhibits no maxillary sinus tenderness and no frontal sinus tenderness. Left sinus exhibits no maxillary sinus tenderness and no frontal sinus tenderness.   Mouth/Throat: Uvula is midline, oropharynx is clear and moist and mucous membranes are normal. Mucous membranes are not pale and not dry. No trismus in the jaw. No uvula swelling or dental caries. No oropharyngeal exudate or posterior oropharyngeal erythema.   Eyes: Conjunctivae, EOM and lids are normal. Pupils are equal, round, and reactive to light. Right eye exhibits no chemosis. Left eye exhibits no chemosis. Right conjunctiva is not injected. Left conjunctiva is not injected. No scleral icterus. Right eye exhibits normal extraocular motion and no nystagmus. Left eye exhibits normal extraocular motion and no nystagmus.   Neck: Trachea normal and phonation normal. No tracheal tenderness present. No tracheal deviation present. No thyroid mass and no thyromegaly present.   Cardiovascular: Intact distal pulses.    Pulmonary/Chest: Effort normal. No stridor. No respiratory distress.   Abdominal: She exhibits no distension.   Lymphadenopathy:        Head (right side): No submental, no submandibular, no preauricular and no posterior auricular adenopathy present.        Head (left side): No submental, no submandibular, no preauricular and no posterior auricular adenopathy present.     She has no cervical adenopathy.   Neurological: She is alert and oriented to person, place, and time. No cranial nerve deficit.   Skin: Skin is warm and dry. No rash noted. No erythema.   Psychiatric: She has a normal mood and affect. Her behavior is normal.         AUDIOGRAM:  Results reveal normal hearing 125-8000 Hz for the right ear, and mild-to-severe sensorineural hearing loss 125-8000 Hz for the left ear.   Pure-tone Shila was negative at test frequencies  1000 Hz and 2000 Hz.  Speech Reception Thresholds were  15 dBHL for the right ear and 45 dBHL for the left ear.   Word recognition scores were excellent for the right ear and excellent for the left ear.   Tympanograms were Type A for the right ear and Type A for the left ear.  Ipsilateral acoustic reflexes were normal for the right ear and absent for the left ear.    Distortion-product otoacoustic emissions at test frequencies 7709-8453 Hz were normal for the the right ear and absent for the left ear , consistent with outer hair cell dysfunction.            Assessment:       1. Asymmetrical hearing loss of left ear        Plan:            We reviewed the recent audiogram, and the fact that there is a distinct asymmetry of at least 15 dB upward across all frequencies.  We discussed that as humans we are not entirely symmetric, and that many people have one ear that hears better than the other.  However, considering the degree of asymmetry, I would recommend an MRI of the IAC with contrast to evaluate for any retrocochlear lesions.  If that is normal, the patient will continue to follow with repeat audiogram in six months and then annually if stable.  She will be medically cleared for hearing aid on left if MRI negative.

## 2018-03-07 NOTE — PROGRESS NOTES
Jeannine Steiner was seen 03/07/2018 for an audiological evaluation prior to Alessia Richard PA-C.  Patient complains of progressive hearing loss in her left ear over the past few years, more rapid decline over the past one year.  She denies tinnitus or dizziness. No family history of hearing loss, no significant loud noise history, no history of ear infections or ear surgery.  She was diagnosed with herpes simplex virus about two years ago, and is medically managed.       Results reveal normal hearing 125-8000 Hz for the right ear, and mild-to-severe sensorineural hearing loss 125-8000 Hz for the left ear.   Pure-tone Shila was negative at test frequencies 1000 Hz and 2000 Hz.  Speech Reception Thresholds were  15 dBHL for the right ear and 45 dBHL for the left ear.   Word recognition scores were excellent for the right ear and excellent for the left ear.   Tympanograms were Type A for the right ear and Type A for the left ear.  Ipsilateral acoustic reflexes were normal for the right ear and absent for the left ear.    Distortion-product otoacoustic emissions at test frequencies 6391-7249 Hz were normal for the the right ear and absent for the left ear , consistent with outer hair cell dysfunction.    Patient was counseled on the above findings.    Recommendations:  1. ENT  2. Audiologic monitoring every 6 months to annuals if stable  3. Hearing aid, left ear, pending medical clearance

## 2018-03-07 NOTE — LETTER
March 7, 2018      Artem Angel, CCC-A  [address] [place] [address] [address]  [facility] [address]           [facility]  [address] [place] [address]  Phone: [phone]  Fax: [phone]          Patient: Jeannine Steiner   MR Number: [id number]   YOB: 1980   Date of Visit: 3/7/2018       Dear Jessica Shaw:    Thank you for referring Jeannine Steiner to me for evaluation. Attached you will find relevant portions of my assessment and plan of care.    If you have questions, please do not hesitate to call me. I look forward to following Jeannine Steiner along with you.    Sincerely,    Alessia Richard PA-C    Enclosure  CC:  No Recipients    If you would like to receive this communication electronically, please contact [email or web address] or [phone] to request more information on [facility] access.    For providers and/or their staff who would like to refer a patient to [facility], please contact us through our one-stop-shop provider referral line, [facility], at [phone].    If you feel you have received this communication in error or would no longer like to receive these types of communications, please e-mail [email or web address]

## 2018-03-13 ENCOUNTER — TELEPHONE (OUTPATIENT)
Dept: FAMILY MEDICINE | Facility: CLINIC | Age: 38
End: 2018-03-13

## 2018-03-13 DIAGNOSIS — Z87.39 H/O SLIPPED CAPITAL FEMORAL EPIPHYSIS (SCFE): Primary | ICD-10-CM

## 2018-03-13 NOTE — TELEPHONE ENCOUNTER
----- Message from Fay Flores sent at 3/13/2018  2:39 PM CDT -----  Contact: Pt  Pt is calling in regards to wanting to speak with the nurse concerning she is having lower back pain.     Pt stated that if she scheduled an appt with the DR would there be anything that would be done for the pain or does she have to see the pain management Dr.    Pt can be reached at .[phone] (xqqr)

## 2018-03-13 NOTE — TELEPHONE ENCOUNTER
Pt having low back pain. She is seeing Dr Christine for pain management. She would like to know if she makes an appt with you would you be able to do anything for the pain or should she wait to see pain management again. Pain is in SI joint on her right side. Please advise.

## 2018-03-13 NOTE — TELEPHONE ENCOUNTER
We don't manage chronic pain . Please also  Explain  to pt that  she will see pm however  it doesn't  mean that 1st thing she will be prescribed will be pain medication

## 2018-03-14 ENCOUNTER — OFFICE VISIT (OUTPATIENT)
Dept: FAMILY MEDICINE | Facility: CLINIC | Age: 38
End: 2018-03-14
Payer: COMMERCIAL

## 2018-03-14 VITALS
HEIGHT: 67 IN | SYSTOLIC BLOOD PRESSURE: 118 MMHG | BODY MASS INDEX: 32.01 KG/M2 | OXYGEN SATURATION: 99 % | TEMPERATURE: 98 F | WEIGHT: 203.94 LBS | HEART RATE: 91 BPM | DIASTOLIC BLOOD PRESSURE: 82 MMHG

## 2018-03-14 DIAGNOSIS — G89.29 CHRONIC BILATERAL LOW BACK PAIN, WITH SCIATICA PRESENCE UNSPECIFIED: ICD-10-CM

## 2018-03-14 DIAGNOSIS — M54.41 ACUTE RIGHT-SIDED LOW BACK PAIN WITH RIGHT-SIDED SCIATICA: Primary | ICD-10-CM

## 2018-03-14 DIAGNOSIS — M54.5 CHRONIC BILATERAL LOW BACK PAIN, WITH SCIATICA PRESENCE UNSPECIFIED: ICD-10-CM

## 2018-03-14 PROCEDURE — 99214 OFFICE O/P EST MOD 30 MIN: CPT | Mod: S$GLB,,, | Performed by: FAMILY MEDICINE

## 2018-03-14 PROCEDURE — 99999 PR PBB SHADOW E&M-EST. PATIENT-LVL III: CPT | Mod: PBBFAC,,, | Performed by: FAMILY MEDICINE

## 2018-03-14 RX ORDER — TIZANIDINE HYDROCHLORIDE 4 MG/1
1 CAPSULE, GELATIN COATED ORAL DAILY PRN
COMMUNITY
Start: 2018-02-22 | End: 2018-08-24

## 2018-03-14 RX ORDER — PREDNISONE 20 MG/1
TABLET ORAL
Qty: 10 TABLET | Refills: 0 | Status: SHIPPED | OUTPATIENT
Start: 2018-03-14 | End: 2018-03-22

## 2018-03-16 ENCOUNTER — HOSPITAL ENCOUNTER (OUTPATIENT)
Dept: RADIOLOGY | Facility: HOSPITAL | Age: 38
Discharge: HOME | End: 2018-03-16
Attending: PHYSICIAN ASSISTANT
Payer: COMMERCIAL

## 2018-03-16 PROCEDURE — 70553 MRI BRAIN STEM W/O & W/DYE: CPT | Mod: TC

## 2018-03-16 PROCEDURE — A9585 GADOBUTROL INJECTION: HCPCS | Performed by: PHYSICIAN ASSISTANT

## 2018-03-16 PROCEDURE — 25500020 PHARM REV CODE 255: Performed by: PHYSICIAN ASSISTANT

## 2018-03-16 RX ORDER — GADOBUTROL 604.72 MG/ML
9 INJECTION INTRAVENOUS
Status: COMPLETED | OUTPATIENT
Start: 2018-03-16 | End: 2018-03-16

## 2018-03-16 RX ADMIN — GADOBUTROL 9 ML: 604.72 INJECTION INTRAVENOUS at 05:03

## 2018-03-20 ENCOUNTER — TELEPHONE (OUTPATIENT)
Dept: OTOLARYNGOLOGY | Facility: CLINIC | Age: 38
End: 2018-03-20

## 2018-03-20 DIAGNOSIS — D33.3 LEFT ACOUSTIC NEUROMA: Primary | ICD-10-CM

## 2018-03-20 NOTE — TELEPHONE ENCOUNTER
Called and reviewed results of MRI with patient - left acoustic neuroma.  Offered referral to Dr. Alcala in [place].  She prefers to stay in [place].  Referral placed for Dr. Gonzalez.  Instructed her to call me next week if she's not heard from them.

## 2018-03-22 ENCOUNTER — OFFICE VISIT (OUTPATIENT)
Dept: FAMILY MEDICINE | Facility: CLINIC | Age: 38
End: 2018-03-22
Payer: COMMERCIAL

## 2018-03-22 ENCOUNTER — HOSPITAL ENCOUNTER (OUTPATIENT)
Dept: RADIOLOGY | Facility: HOSPITAL | Age: 38
Discharge: HOME | End: 2018-03-22
Attending: FAMILY MEDICINE
Payer: COMMERCIAL

## 2018-03-22 VITALS
OXYGEN SATURATION: 98 % | TEMPERATURE: 98 F | SYSTOLIC BLOOD PRESSURE: 124 MMHG | BODY MASS INDEX: 32.09 KG/M2 | DIASTOLIC BLOOD PRESSURE: 76 MMHG | WEIGHT: 204.94 LBS | HEART RATE: 68 BPM

## 2018-03-22 DIAGNOSIS — F41.0 ANXIETY ATTACK: ICD-10-CM

## 2018-03-22 DIAGNOSIS — R07.9 CHEST PAIN, UNSPECIFIED TYPE: ICD-10-CM

## 2018-03-22 DIAGNOSIS — R07.9 CHEST PAIN, UNSPECIFIED TYPE: Primary | ICD-10-CM

## 2018-03-22 DIAGNOSIS — D33.3 ACOUSTIC NEUROMA: ICD-10-CM

## 2018-03-22 PROCEDURE — 99214 OFFICE O/P EST MOD 30 MIN: CPT | Mod: S$GLB,,, | Performed by: FAMILY MEDICINE

## 2018-03-22 PROCEDURE — 93010 ELECTROCARDIOGRAM REPORT: CPT | Mod: S$GLB,,, | Performed by: INTERNAL MEDICINE

## 2018-03-22 PROCEDURE — 99999 PR PBB SHADOW E&M-EST. PATIENT-LVL III: CPT | Mod: PBBFAC,,, | Performed by: FAMILY MEDICINE

## 2018-03-22 PROCEDURE — 71046 X-RAY EXAM CHEST 2 VIEWS: CPT | Mod: TC,PO

## 2018-03-22 PROCEDURE — 93005 ELECTROCARDIOGRAM TRACING: CPT | Mod: S$GLB,,, | Performed by: FAMILY MEDICINE

## 2018-03-22 PROCEDURE — 71046 X-RAY EXAM CHEST 2 VIEWS: CPT | Mod: 26,,, | Performed by: RADIOLOGY

## 2018-03-22 RX ORDER — VENLAFAXINE HYDROCHLORIDE 37.5 MG/1
37.5 CAPSULE, EXTENDED RELEASE ORAL DAILY
Qty: 30 CAPSULE | Refills: 0 | Status: SHIPPED | OUTPATIENT
Start: 2018-03-22 | End: 2018-04-14 | Stop reason: SDUPTHER

## 2018-03-22 NOTE — PROGRESS NOTES
Subjective:       Patient ID: Jeannine Steiner is a 37 y.o. female.    Chief Complaint: Anxiety    37 y old female with SAD, depression here for f.u . Not doing well , she was found to have acoustic neuroma . Will be seeing  Dr Gonzalez  In few weeks . Previously  on Cymbalta that she stopped taking due to no longer feeling depressed. Now with  Episodes CP and sob . Not linked to activity . Last few seconds       Anxiety   Symptoms include chest pain and shortness of breath.         Review of Systems   Constitutional: Negative.    HENT: Negative.    Respiratory: Positive for shortness of breath.    Cardiovascular: Positive for chest pain.   Gastrointestinal: Negative.    Musculoskeletal: Negative.    Neurological: Negative.    Hematological: Negative.    Psychiatric/Behavioral: Positive for agitation.       Objective:      Physical Exam   Constitutional: She is oriented to person, place, and time. She appears well-developed and well-nourished. No distress.   HENT:   Head: Normocephalic and atraumatic.   Right Ear: External ear normal.   Left Ear: External ear normal.   Mouth/Throat: No oropharyngeal exudate.   Eyes: Conjunctivae and EOM are normal. Pupils are equal, round, and reactive to light. Right eye exhibits no discharge. Left eye exhibits no discharge. No scleral icterus.   Neck: Normal range of motion. Neck supple. No JVD present. No tracheal deviation present. No thyromegaly present.   Cardiovascular: Normal rate, regular rhythm and normal heart sounds.  Exam reveals no gallop and no friction rub.    No murmur heard.  Pulmonary/Chest: Effort normal and breath sounds normal. No stridor. No respiratory distress. She has no wheezes. She has no rales. She exhibits no tenderness.   Abdominal: Soft. Bowel sounds are normal. She exhibits no distension. There is no tenderness. There is no rebound and no guarding.   Musculoskeletal: Normal range of motion.   Lymphadenopathy:     She has no cervical  adenopathy.   Neurological: She is alert and oriented to person, place, and time.   Skin: Skin is warm and dry. She is not diaphoretic.   Psychiatric: She has a normal mood and affect. Her behavior is normal. Judgment and thought content normal.       Assessment:       1. Chest pain, unspecified type    2. Anxiety attack        Plan:     Jeannine was seen today for anxiety.    Diagnoses and all orders for this visit:    Chest pain, unspecified type  -     IN OFFICE EKG 12-LEAD (to Muse)  -     X-Ray Chest PA And Lateral; Future  -     TSH; Future  -     CBC auto differential; Future  -     Comprehensive metabolic panel; Future    Anxiety attack  -     IN OFFICE EKG 12-LEAD (to Muse)  -     X-Ray Chest PA And Lateral; Future  -     TSH; Future    Other orders  -     venlafaxine (EFFEXOR-XR) 37.5 MG 24 hr capsule; Take 1 capsule (37.5 mg total) by mouth once daily.    Start Effexor. Will see psych if not improvement . Reassess in 6 w   GAD7: 21

## 2018-03-23 ENCOUNTER — TELEPHONE (OUTPATIENT)
Dept: FAMILY MEDICINE | Facility: CLINIC | Age: 38
End: 2018-03-23

## 2018-03-23 DIAGNOSIS — D72.829 LEUKOCYTOSIS, UNSPECIFIED TYPE: Primary | ICD-10-CM

## 2018-03-27 ENCOUNTER — TELEPHONE (OUTPATIENT)
Dept: FAMILY MEDICINE | Facility: CLINIC | Age: 38
End: 2018-03-27

## 2018-03-27 DIAGNOSIS — R00.1 BRADYCARDIA: Primary | ICD-10-CM

## 2018-04-10 RX ORDER — AMITRIPTYLINE HYDROCHLORIDE 50 MG/1
50 TABLET, FILM COATED ORAL NIGHTLY PRN
Qty: 30 TABLET | Refills: 2 | Status: SHIPPED | OUTPATIENT
Start: 2018-04-10 | End: 2018-06-21

## 2018-04-16 RX ORDER — VENLAFAXINE HYDROCHLORIDE 37.5 MG/1
CAPSULE, EXTENDED RELEASE ORAL
Qty: 30 CAPSULE | Refills: 0 | Status: SHIPPED | OUTPATIENT
Start: 2018-04-16 | End: 2018-06-21

## 2018-04-16 RX ORDER — LORAZEPAM 1 MG/1
1 TABLET ORAL 2 TIMES DAILY
Qty: 60 TABLET | Refills: 0 | Status: SHIPPED | OUTPATIENT
Start: 2018-04-16 | End: 2018-06-21

## 2018-04-16 NOTE — TELEPHONE ENCOUNTER
Spoke with patient and rescheduled her lab appointment, she verbalized understanding and forwarded refill request for Ativan for Dr. Siegel.

## 2018-04-16 NOTE — TELEPHONE ENCOUNTER
----- Message from Nona Bhardwaj sent at 4/16/2018  8:44 AM CDT -----  Contact: pt  Pt need order to get lab and pt also need a refill for Ativan sent [facility].

## 2018-04-17 RX ORDER — LORAZEPAM 1 MG/1
1 TABLET ORAL 2 TIMES DAILY
Qty: 60 TABLET | Refills: 0 | OUTPATIENT
Start: 2018-04-17

## 2018-04-17 NOTE — TELEPHONE ENCOUNTER
----- Message from Cee Frausto sent at 4/17/2018  4:34 PM CDT -----  Contact: pt  1. What is the name of the medication you are requesting? Adidan  2. What is the dose? 1 mg  3. How do you take the medication? Orally, topically, etc? mouth  4. How often do you take this medication? Twice daily  5. Do you need a 30 day or 90 day supply? 30  6. How many refills are you requesting? 1  7. What is your preferred pharmacy and location of the pharmacy? [facility] [address]  8. Who can we contact with further questions? [phone]

## 2018-04-23 ENCOUNTER — LAB VISIT (OUTPATIENT)
Dept: LAB | Facility: HOSPITAL | Age: 38
End: 2018-04-23
Attending: FAMILY MEDICINE
Payer: COMMERCIAL

## 2018-04-23 DIAGNOSIS — D72.829 LEUKOCYTOSIS, UNSPECIFIED TYPE: ICD-10-CM

## 2018-04-23 LAB
BASOPHILS # BLD AUTO: 0.07 K/UL
BASOPHILS NFR BLD: 0.7 %
DIFFERENTIAL METHOD: ABNORMAL
EOSINOPHIL # BLD AUTO: 0.4 K/UL
EOSINOPHIL NFR BLD: 3.3 %
ERYTHROCYTE [DISTWIDTH] IN BLOOD BY AUTOMATED COUNT: 12.5 %
HCT VFR BLD AUTO: 40.9 %
HGB BLD-MCNC: 13.5 G/DL
IMM GRANULOCYTES # BLD AUTO: 0.03 K/UL
IMM GRANULOCYTES NFR BLD AUTO: 0.3 %
LYMPHOCYTES # BLD AUTO: 2.9 K/UL
LYMPHOCYTES NFR BLD: 27.8 %
MCH RBC QN AUTO: 32.5 PG
MCHC RBC AUTO-ENTMCNC: 33 G/DL
MCV RBC AUTO: 98 FL
MONOCYTES # BLD AUTO: 0.8 K/UL
MONOCYTES NFR BLD: 7.8 %
NEUTROPHILS # BLD AUTO: 6.3 K/UL
NEUTROPHILS NFR BLD: 60.1 %
NRBC BLD-RTO: 0 /100 WBC
PLATELET # BLD AUTO: 210 K/UL
PMV BLD AUTO: 11 FL
RBC # BLD AUTO: 4.16 M/UL
WBC # BLD AUTO: 10.45 K/UL

## 2018-04-23 PROCEDURE — 85025 COMPLETE CBC W/AUTO DIFF WBC: CPT

## 2018-04-23 PROCEDURE — 36415 COLL VENOUS BLD VENIPUNCTURE: CPT | Mod: PO

## 2018-05-09 ENCOUNTER — OFFICE VISIT (OUTPATIENT)
Dept: CARDIOLOGY | Facility: CLINIC | Age: 38
End: 2018-05-09
Payer: COMMERCIAL

## 2018-05-09 VITALS
SYSTOLIC BLOOD PRESSURE: 108 MMHG | HEIGHT: 67 IN | HEART RATE: 68 BPM | DIASTOLIC BLOOD PRESSURE: 78 MMHG | BODY MASS INDEX: 33.94 KG/M2 | WEIGHT: 216.25 LBS

## 2018-05-09 DIAGNOSIS — D33.3 ACOUSTIC NEUROMA: ICD-10-CM

## 2018-05-09 DIAGNOSIS — F41.9 ANXIETY AND DEPRESSION: ICD-10-CM

## 2018-05-09 DIAGNOSIS — F32.A ANXIETY AND DEPRESSION: ICD-10-CM

## 2018-05-09 DIAGNOSIS — Z01.810 PREOP CARDIOVASCULAR EXAM: Primary | ICD-10-CM

## 2018-05-09 DIAGNOSIS — F17.200 SMOKER: ICD-10-CM

## 2018-05-09 PROCEDURE — 99244 OFF/OP CNSLTJ NEW/EST MOD 40: CPT | Mod: S$GLB,,, | Performed by: INTERNAL MEDICINE

## 2018-05-09 PROCEDURE — 99999 PR PBB SHADOW E&M-EST. PATIENT-LVL III: CPT | Mod: PBBFAC,,, | Performed by: INTERNAL MEDICINE

## 2018-05-09 NOTE — LETTER
May 9, 2018      Evelin Teague MD  [address] [facility] [address]           [facility] - Cardiology  [address] [facility] [address]  Phone: [phone]  Fax: [phone]          Patient: Jeannine Steiner   MR Number: [id number]   YOB: 1980   Date of Visit: 5/9/2018       Dear Dr. Evelin Teague:    Thank you for referring Jeannine Steiner to me for evaluation. Attached you will find relevant portions of my assessment and plan of care.    If you have questions, please do not hesitate to call me. I look forward to following Jeannine Steiner along with you.    Sincerely,    Tim De Luna MD    Enclosure  CC:  No Recipients    If you would like to receive this communication electronically, please contact [email or web address] or [phone] to request more information on [employer] Link access.    For providers and/or their staff who would like to refer a patient to [facility], please contact us through our one-stop-shop provider referral line, [facility], at [phone].    If you feel you have received this communication in error or would no longer like to receive these types of communications, please e-mail [email or web address]

## 2018-05-09 NOTE — PROGRESS NOTES
Subjective:   Patient ID:  Jeannine Steiner is a 37 y.o. female who presents for evaluation of Establish Care and Abnormal ECG      36 yo female, referred for chest pain. Kyler.  Preop clearance of left acoustic neuroma removal at [facility] on 05/15/2018  PMH anxiety, depression, smoker 1ppd x20 yrs. Occasional drinking. No f/h premature CAD.  S/p hip replacement. Estella to Slipped capital femoral epiphysis at teenage. On percocet  She denied dyspnea on exertion, palpitation, fainting, PND, orthopnea, syncope and claudication.   Chronic occasional resting chest tightness, though related to anxiety..  EKG NSR HR 59 bpm        Past Medical History:   Diagnosis Date    Anxiety and depression     Asthma     Chronic hip pain     Osteoarthritis of hip 06/2012       Past Surgical History:   Procedure Laterality Date    HYSTERECTOMY  06/25/2014    hys only - pain/bleeding    SLIPPED CAPITAL FEMORAL EPIPHYSIS PINNING Bilateral 1989, 1990    pins placed    TOTAL HIP ARTHROPLASTY Right 1/2013       Social History   Substance Use Topics    Smoking status: Current Every Day Smoker     Types: Cigarettes    Smokeless tobacco: Never Used    Alcohol use 0.0 oz/week       Family History   Problem Relation Age of Onset    Diabetes Maternal Grandfather     Hypertension Paternal Grandmother     Colon cancer Maternal Uncle     Arthritis Maternal Aunt         RA    Arthritis Maternal Grandmother         RA    Arthritis Maternal Aunt         LUPUS       Review of Systems   Constitution: Negative for decreased appetite, diaphoresis, fever, weakness, malaise/fatigue and night sweats.   HENT: Negative for nosebleeds.    Eyes: Negative for blurred vision and double vision.   Cardiovascular: Positive for chest pain. Negative for claudication, dyspnea on exertion, irregular heartbeat, leg swelling, near-syncope, orthopnea, palpitations, paroxysmal nocturnal dyspnea and syncope.   Respiratory: Negative for cough, shortness of  breath, sleep disturbances due to breathing, snoring, sputum production and wheezing.    Endocrine: Negative for cold intolerance and polyuria.   Hematologic/Lymphatic: Does not bruise/bleed easily.   Skin: Negative for rash.   Musculoskeletal: Negative for back pain, falls, joint pain, joint swelling and neck pain.   Gastrointestinal: Negative for abdominal pain, heartburn, nausea and vomiting.   Genitourinary: Negative for dysuria, frequency and hematuria.   Neurological: Negative for difficulty with concentration, dizziness, focal weakness, headaches, light-headedness, numbness and seizures.   Psychiatric/Behavioral: Negative for depression, memory loss and substance abuse. The patient does not have insomnia.    Allergic/Immunologic: Negative for HIV exposure and hives.       Objective:   Physical Exam   Constitutional: She is oriented to person, place, and time. She appears well-nourished.   HENT:   Head: Normocephalic.   Eyes: Pupils are equal, round, and reactive to light.   Neck: Normal carotid pulses and no JVD present. Carotid bruit is not present. No thyromegaly present.   Cardiovascular: Normal rate, regular rhythm, normal heart sounds and normal pulses.   No extrasystoles are present. PMI is not displaced.  Exam reveals no gallop and no S3.    No murmur heard.  Pulmonary/Chest: Breath sounds normal. No stridor. No respiratory distress.   Abdominal: Soft. Bowel sounds are normal. There is no tenderness. There is no rebound.   Musculoskeletal: Normal range of motion.   Neurological: She is alert and oriented to person, place, and time.   Skin: Skin is intact. No rash noted.   Psychiatric: Her behavior is normal.       Lab Results   Component Value Date    CHOL 184 01/07/2016     Lab Results   Component Value Date    HDL 54 01/07/2016     Lab Results   Component Value Date    LDLCALC 102.8 01/07/2016     Lab Results   Component Value Date    TRIG 136 01/07/2016     Lab Results   Component Value Date     CHOLHDL 29.3 01/07/2016       Chemistry        Component Value Date/Time     03/22/2018 1624    K 4.7 03/22/2018 1624     03/22/2018 1624    CO2 23 03/22/2018 1624    BUN 11 03/22/2018 1624    CREATININE 0.8 03/22/2018 1624    GLU 84 03/22/2018 1624        Component Value Date/Time    CALCIUM 9.5 03/22/2018 1624    ALKPHOS 68 03/22/2018 1624    AST 17 03/22/2018 1624    ALT 16 03/22/2018 1624    BILITOT 0.7 03/22/2018 1624    ESTGFRAFRICA >60.0 03/22/2018 1624    EGFRNONAA >60.0 03/22/2018 1624          Lab Results   Component Value Date    TSH 0.556 03/22/2018     No results found for: INR, PROTIME  Lab Results   Component Value Date    WBC 10.45 04/23/2018    HGB 13.5 04/23/2018    HCT 40.9 04/23/2018    MCV 98 04/23/2018     04/23/2018     BNP  @LABRCNTIP(BNP,BNPTRIAGEBLO)@  CrCl cannot be calculated (Patient's most recent lab result is older than the maximum 7 days allowed.).     Assessment:      1. Preop cardiovascular exam    2. Acoustic neuroma    3. Anxiety and depression    4. Smoker      FAX: [phone]  NONCARDIAC CHEST PAIN    Plan:   LOW periop risk of CV events for non-high risk procedure.  Good functional and exercise capacity.  No chest pain, active arrhythmia and CHF symptoms.  Ok to proceed the scheduled surgery without further cardiac study.

## 2018-05-11 ENCOUNTER — TELEPHONE (OUTPATIENT)
Dept: CARDIOLOGY | Facility: CLINIC | Age: 38
End: 2018-05-11

## 2018-05-11 NOTE — TELEPHONE ENCOUNTER
Note faxed.    ----- Message from Tim De Luna MD sent at 5/9/2018  5:11 PM CDT -----  PLEASE Fax my note to the surgeon's office

## 2018-05-24 ENCOUNTER — TELEPHONE (OUTPATIENT)
Dept: FAMILY MEDICINE | Facility: CLINIC | Age: 38
End: 2018-05-24

## 2018-05-24 NOTE — TELEPHONE ENCOUNTER
She  Has breached   The CSC contract then . I wont prescribed any more CSC to this pt . Must see psych . Ref?

## 2018-05-24 NOTE — TELEPHONE ENCOUNTER
Spoke with patient and informed her that since she upped her Ativan from 1mg BID to 2mg BID without coming to the office to see Dr. Siegel or discussing this with her she was in breach of her Controlled Substance Contract. Patient verbalized understanding and was made aware that Dr. Siegel will no longer prescribe Controlled Substances for the patient. She was made aware that she will need to contact Psych and was given the phone number to schedule an appt. Patient was also made aware that she can contact her insurance company and be given a list of providers that they will cover. Patient verbalized understanding and did not have any further questions at this time.

## 2018-06-21 ENCOUNTER — OFFICE VISIT (OUTPATIENT)
Dept: FAMILY MEDICINE | Facility: CLINIC | Age: 38
End: 2018-06-21
Payer: COMMERCIAL

## 2018-06-21 VITALS
BODY MASS INDEX: 32.62 KG/M2 | DIASTOLIC BLOOD PRESSURE: 84 MMHG | HEART RATE: 93 BPM | WEIGHT: 207.81 LBS | TEMPERATURE: 98 F | HEIGHT: 67 IN | SYSTOLIC BLOOD PRESSURE: 126 MMHG

## 2018-06-21 DIAGNOSIS — Z86.018 S/P EXCISION OF ACOUSTIC NEUROMA: Primary | ICD-10-CM

## 2018-06-21 DIAGNOSIS — Z98.890 S/P EXCISION OF ACOUSTIC NEUROMA: Primary | ICD-10-CM

## 2018-06-21 PROCEDURE — 3008F BODY MASS INDEX DOCD: CPT | Mod: CPTII,S$GLB,, | Performed by: FAMILY MEDICINE

## 2018-06-21 PROCEDURE — 99999 PR PBB SHADOW E&M-EST. PATIENT-LVL III: CPT | Mod: PBBFAC,,, | Performed by: FAMILY MEDICINE

## 2018-06-21 PROCEDURE — 99214 OFFICE O/P EST MOD 30 MIN: CPT | Mod: S$GLB,,, | Performed by: FAMILY MEDICINE

## 2018-06-21 RX ORDER — CLONAZEPAM 0.5 MG/1
0.5 TABLET ORAL 2 TIMES DAILY PRN
COMMUNITY
End: 2018-09-13 | Stop reason: SDUPTHER

## 2018-06-21 RX ORDER — HYDROXYZINE PAMOATE 25 MG/1
25 CAPSULE ORAL 4 TIMES DAILY
COMMUNITY
End: 2019-09-06

## 2018-06-21 RX ORDER — DULOXETIN HYDROCHLORIDE 30 MG/1
30 CAPSULE, DELAYED RELEASE ORAL DAILY
COMMUNITY
End: 2018-09-12 | Stop reason: SDUPTHER

## 2018-06-21 NOTE — PROGRESS NOTES
Subjective:       Patient ID: Jeannine Steiner is a 38 y.o. female.    Chief Complaint: poss mass in left nostril    38 y old female who underwent left translabyrinthine approach resection  of Acoustic neuroma in May 15th by Dr Gonzalez  here to discuss  possible CSF leak . Post of course has been complicated with VII cranial  nerve palsy . She has noted clear rhinorrhea since yesterday  . She feels that she has a knot inside left nostril . She contacted Dr Winston office and was told to see PCP       Review of Systems   Constitutional: Negative.    HENT: Positive for rhinorrhea.    Eyes: Negative.    Respiratory: Negative.    Cardiovascular: Negative.    Gastrointestinal: Negative.    Genitourinary: Negative.    Musculoskeletal: Negative.    Skin: Negative.    Hematological: Negative.        Objective:      Physical Exam   Constitutional: She is oriented to person, place, and time. She appears well-developed and well-nourished. No distress.   HENT:   Head: Normocephalic and atraumatic.   Right Ear: External ear normal.   Left Ear: External ear normal.   Nose: Mucosal edema present.   Mouth/Throat: No oropharyngeal exudate.   Eyes: Conjunctivae and EOM are normal. Pupils are equal, round, and reactive to light. Right eye exhibits no discharge. Left eye exhibits no discharge. No scleral icterus.   Neck: Normal range of motion. Neck supple. No JVD present. No tracheal deviation present. No thyromegaly present.   Cardiovascular: Normal rate, regular rhythm and normal heart sounds.  Exam reveals no gallop and no friction rub.    No murmur heard.  Pulmonary/Chest: Effort normal and breath sounds normal. No stridor. No respiratory distress. She has no wheezes. She has no rales. She exhibits no tenderness.   Abdominal: Soft. Bowel sounds are normal. She exhibits no distension. There is no tenderness. There is no rebound and no guarding.   Musculoskeletal: Normal range of motion.   Lymphadenopathy:     She has no  cervical adenopathy.   Neurological: She is alert and oriented to person, place, and time. A cranial nerve deficit is present.   Skin: Skin is warm and dry. She is not diaphoretic.   Psychiatric: She has a normal mood and affect. Her behavior is normal. Judgment and thought content normal.       Assessment:     Jeannine was seen today for poss mass in left nostril.    Diagnoses and all orders for this visit:    S/P excision of acoustic neuroma      Plan:   CSF leak was not visualized during my assessment . Will contact Dr Winston office for further recommendations  Pt was advised to go to Er if symptoms get worst   Time spent: 25 minutes in face to face discussion concerning diagnosis, prognosis, review of lab and test results, benefits of treatment as well as management of disease, counseling of patient and coordination of care between various health care providers . Greater than half the time spent was used for coordination of care and counseling of patient.

## 2018-06-25 ENCOUNTER — TELEPHONE (OUTPATIENT)
Dept: FAMILY MEDICINE | Facility: CLINIC | Age: 38
End: 2018-06-25

## 2018-06-25 NOTE — TELEPHONE ENCOUNTER
----- Message from Quinn Castillo sent at 6/25/2018 10:49 AM CDT -----  Contact: pt   Calling to find out outcome of conversation about ct scan.        ..[phone] (home)

## 2018-06-28 NOTE — TELEPHONE ENCOUNTER
Finally able to get in touch with Dr Winston office after multiple calls and messages left. Spoke with  today and luckily when I call in the  remember talking to me and was able to let the manager know that I had been calling for someone. Manager states that she is sorry for situation and does not know what happened or who the patient talked to that told her to see her PCP but the patient should have been seen by there office that day. Manager states that she will get to the bottom of the situation and get pt scheduled for appt tomorrow morning.    Called pt and informed of all of the above stated. Pt thanked me and stated that she would let me know if she didn't hear from anyone.

## 2018-07-11 RX ORDER — VALACYCLOVIR HYDROCHLORIDE 500 MG/1
TABLET, FILM COATED ORAL
Qty: 30 TABLET | Refills: 1 | Status: SHIPPED | OUTPATIENT
Start: 2018-07-11 | End: 2018-08-24

## 2018-08-23 ENCOUNTER — TELEPHONE (OUTPATIENT)
Dept: FAMILY MEDICINE | Facility: CLINIC | Age: 38
End: 2018-08-23

## 2018-08-23 NOTE — TELEPHONE ENCOUNTER
----- Message from Cee Frausto sent at 8/23/2018  3:36 PM CDT -----  Contact: pt  Calling in regards to breaking out in hives and not sure what it is an dhave concerns and please advise. [phone] (home)

## 2018-08-23 NOTE — TELEPHONE ENCOUNTER
"Spoke to pt. Pt states she just recently got a new apartment and has been getting what seems like bites. Pt states she is the only one in the household getting "bit" but it starts out as two or three little bumps and as soon as she touches them or scratches them it spreads and turns into one large bump. Pt ask if it could be something she ate and what should she do about it? Recommended her to go to urgent care.  Please advise.  "

## 2018-08-23 NOTE — TELEPHONE ENCOUNTER
----- Message from Zoila Richard sent at 8/23/2018  3:56 PM CDT -----  Contact: pt  The pt request a return call, no additional info given, can be reached at [phone]///[id number]

## 2018-08-24 ENCOUNTER — OFFICE VISIT (OUTPATIENT)
Dept: FAMILY MEDICINE | Facility: CLINIC | Age: 38
End: 2018-08-24
Payer: MEDICAID

## 2018-08-24 VITALS
DIASTOLIC BLOOD PRESSURE: 70 MMHG | OXYGEN SATURATION: 98 % | TEMPERATURE: 99 F | WEIGHT: 211.06 LBS | HEART RATE: 86 BPM | RESPIRATION RATE: 18 BRPM | SYSTOLIC BLOOD PRESSURE: 105 MMHG | BODY MASS INDEX: 33.06 KG/M2

## 2018-08-24 DIAGNOSIS — L30.9 DERMATITIS: Primary | ICD-10-CM

## 2018-08-24 PROCEDURE — 99213 OFFICE O/P EST LOW 20 MIN: CPT | Mod: PBBFAC,PO | Performed by: NURSE PRACTITIONER

## 2018-08-24 PROCEDURE — 99999 PR PBB SHADOW E&M-EST. PATIENT-LVL III: CPT | Mod: PBBFAC,,, | Performed by: NURSE PRACTITIONER

## 2018-08-24 PROCEDURE — 99213 OFFICE O/P EST LOW 20 MIN: CPT | Mod: S$PBB,,, | Performed by: NURSE PRACTITIONER

## 2018-08-24 RX ORDER — ONDANSETRON 4 MG/1
TABLET, ORALLY DISINTEGRATING ORAL
Refills: 0 | COMMUNITY
Start: 2018-07-19 | End: 2020-08-03

## 2018-08-24 RX ORDER — TRIAMCINOLONE ACETONIDE 1 MG/G
CREAM TOPICAL 2 TIMES DAILY
Qty: 45 G | Refills: 0 | Status: SHIPPED | OUTPATIENT
Start: 2018-08-24 | End: 2019-09-06

## 2018-08-24 RX ORDER — METHYLPREDNISOLONE ACETATE 80 MG/ML
80 INJECTION, SUSPENSION INTRA-ARTICULAR; INTRALESIONAL; INTRAMUSCULAR; SOFT TISSUE ONCE
Status: COMPLETED | OUTPATIENT
Start: 2018-08-24 | End: 2018-08-24

## 2018-08-24 RX ORDER — LORATADINE 10 MG/1
10 TABLET ORAL DAILY
Qty: 7 TABLET | Refills: 0 | COMMUNITY
Start: 2018-08-24 | End: 2018-09-13 | Stop reason: SDUPTHER

## 2018-08-24 RX ADMIN — METHYLPREDNISOLONE ACETATE 80 MG: 80 INJECTION, SUSPENSION INTRALESIONAL; INTRAMUSCULAR; INTRASYNOVIAL; SOFT TISSUE at 03:08

## 2018-08-24 NOTE — PROGRESS NOTES
"Subjective:       Patient ID: Jeannine Steiner is a 38 y.o. female.    Chief Complaint: Insect Bite  pt reports to clinic with chief complaint of allergic reaction. Onset approx 1 week ago.  Pt reports she recently moved into a new apt.  States she thought maybe it had fleas.  It has area "bombed and pest control".  Reports itching.  States area appears, resolves but returns.  She is the only person in the home with symptoms. Denies contact with new chemicals, plants or animals. No animals in the home.   HPI  Review of Systems   Constitutional: Negative.    HENT: Negative.    Respiratory: Negative.    Cardiovascular: Negative.    Skin: Positive for color change and rash.       Objective:      Physical Exam   Constitutional: She appears well-developed and well-nourished.   HENT:   Head: Normocephalic.   Eyes: EOM are normal.   Neck: Neck supple.   Cardiovascular: Normal rate and normal heart sounds.   Pulmonary/Chest: Effort normal.   Skin: Rash noted.        Vitals reviewed.      Assessment:       1. Dermatitis        Plan:   Dermatitis  -     methylPREDNISolone acetate injection 80 mg; Inject 1 mL (80 mg total) into the muscle once.  -     loratadine (CLARITIN) 10 mg tablet; Take 1 tablet (10 mg total) by mouth once daily. for 7 days  Dispense: 7 tablet; Refill: 0  -     ranitidine (ZANTAC) 150 MG tablet; Take 1 tablet (150 mg total) by mouth 2 (two) times daily. for 7 days  Dispense: 14 tablet; Refill: 0  If no improvement notify clinic will refer to derm    No Follow-up on file.    "

## 2018-09-12 DIAGNOSIS — L30.9 DERMATITIS: ICD-10-CM

## 2018-09-12 RX ORDER — DULOXETIN HYDROCHLORIDE 30 MG/1
30 CAPSULE, DELAYED RELEASE ORAL DAILY
Qty: 30 CAPSULE | Refills: 0 | Status: SHIPPED | OUTPATIENT
Start: 2018-09-12 | End: 2018-09-13

## 2018-09-12 RX ORDER — CLONAZEPAM 0.5 MG/1
0.5 TABLET ORAL 2 TIMES DAILY PRN
Status: CANCELLED | OUTPATIENT
Start: 2018-09-12

## 2018-09-13 ENCOUNTER — OFFICE VISIT (OUTPATIENT)
Dept: FAMILY MEDICINE | Facility: CLINIC | Age: 38
End: 2018-09-13
Payer: MEDICAID

## 2018-09-13 ENCOUNTER — TELEPHONE (OUTPATIENT)
Dept: FAMILY MEDICINE | Facility: CLINIC | Age: 38
End: 2018-09-13

## 2018-09-13 VITALS
OXYGEN SATURATION: 98 % | TEMPERATURE: 98 F | SYSTOLIC BLOOD PRESSURE: 128 MMHG | BODY MASS INDEX: 32.76 KG/M2 | HEIGHT: 67 IN | WEIGHT: 208.75 LBS | HEART RATE: 88 BPM | DIASTOLIC BLOOD PRESSURE: 82 MMHG

## 2018-09-13 DIAGNOSIS — F32.A ANXIETY AND DEPRESSION: ICD-10-CM

## 2018-09-13 DIAGNOSIS — D33.3 ACOUSTIC NEUROMA: Primary | ICD-10-CM

## 2018-09-13 DIAGNOSIS — L30.9 DERMATITIS: ICD-10-CM

## 2018-09-13 DIAGNOSIS — L50.9 HIVES: ICD-10-CM

## 2018-09-13 DIAGNOSIS — F41.9 ANXIETY AND DEPRESSION: ICD-10-CM

## 2018-09-13 PROCEDURE — 99213 OFFICE O/P EST LOW 20 MIN: CPT | Mod: PBBFAC,PO | Performed by: FAMILY MEDICINE

## 2018-09-13 PROCEDURE — 99999 PR PBB SHADOW E&M-EST. PATIENT-LVL III: CPT | Mod: PBBFAC,,, | Performed by: FAMILY MEDICINE

## 2018-09-13 PROCEDURE — 99214 OFFICE O/P EST MOD 30 MIN: CPT | Mod: S$PBB,,, | Performed by: FAMILY MEDICINE

## 2018-09-13 RX ORDER — CLONAZEPAM 0.5 MG/1
0.5 TABLET ORAL 2 TIMES DAILY PRN
Qty: 30 TABLET | Refills: 0 | Status: SHIPPED | OUTPATIENT
Start: 2018-09-13 | End: 2019-09-06

## 2018-09-13 RX ORDER — LORATADINE 10 MG/1
10 TABLET ORAL DAILY
Qty: 30 TABLET | Refills: 1 | COMMUNITY
Start: 2018-09-13 | End: 2019-09-06

## 2018-09-13 RX ORDER — DULOXETIN HYDROCHLORIDE 60 MG/1
60 CAPSULE, DELAYED RELEASE ORAL DAILY
Qty: 30 CAPSULE | Refills: 1 | Status: SHIPPED | OUTPATIENT
Start: 2018-09-13 | End: 2019-09-06

## 2018-09-13 NOTE — PROGRESS NOTES
Subjective:       Patient ID: Jeannine Steiner is a 38 y.o. female.    Chief Complaint: Follow-up      HPI Comments:       Current Outpatient Medications:     clonazePAM (KLONOPIN) 0.5 MG tablet, Take 1 tablet (0.5 mg total) by mouth 2 (two) times daily as needed for Anxiety., Disp: 30 tablet, Rfl: 0    hydrOXYzine pamoate (VISTARIL) 25 MG Cap, Take 25 mg by mouth 4 (four) times daily., Disp: , Rfl:     ondansetron (ZOFRAN-ODT) 4 MG TbDL, DIS 1 T ON THE TONGUE Q 8 H PRF NAUSEA, Disp: , Rfl: 0    oxycodone-acetaminophen (PERCOCET)  mg per tablet, TK 1 T PO Q 6 H PRN, Disp: , Rfl: 0    ranitidine (ZANTAC) 150 MG tablet, Take 1 tablet (150 mg total) by mouth 2 (two) times daily. for 7 days, Disp: 60 tablet, Rfl: 1    DULoxetine (CYMBALTA) 60 MG capsule, Take 1 capsule (60 mg total) by mouth once daily., Disp: 30 capsule, Rfl: 1    loratadine (CLARITIN) 10 mg tablet, Take 1 tablet (10 mg total) by mouth once daily. for 7 days, Disp: 30 tablet, Rfl: 1    triamcinolone acetonide 0.1% (KENALOG) 0.1 % cream, Apply topically 2 (two) times daily. for 10 days, Disp: 45 g, Rfl: 0      She is coming to see me today because she had a discontinuation of her previous insurance and when it was re-established her previous PCP was no longer available.  She is asking for refills today on Klonopin, her hive medicine, Cymbalta.  She says she is taking 90 mg per day of Cymbalta as prescribed by her previous psychiatrist, however she is no longer seeing him because of a problem with a missed appointment.  She is asking for refill today on these medications.  She has not yet started trying to find a new psychiatrist    About a month or so ago she began having recurrent hives.  She was treated with Claritin and ranitidine and it seemed to help but she ran out after week.  She still has a problem off and on.  Usually late at night, which he gets itchy red spots that move around and disappear spontaneously.  She is not  "currently having any lesions. No new foods or medications.  She has been on opioids for many years and never had this problem until recently      Review of Systems   Constitutional: Negative for activity change, appetite change and fever.   HENT: Negative for sore throat.    Respiratory: Negative for cough and shortness of breath.    Cardiovascular: Negative for chest pain.   Gastrointestinal: Negative for abdominal pain, diarrhea and nausea.   Genitourinary: Negative for difficulty urinating.   Musculoskeletal: Positive for back pain. Negative for arthralgias and myalgias.   Skin: Positive for rash.   Neurological: Negative for dizziness and headaches.   Psychiatric/Behavioral: Positive for dysphoric mood. The patient is nervous/anxious.        Objective:      Vitals:    09/13/18 1552   BP: 128/82   Pulse: 88   Temp: 98.1 °F (36.7 °C)   SpO2: 98%   Weight: 94.7 kg (208 lb 12.4 oz)   Height: 5' 7" (1.702 m)   PainSc: 0-No pain     Physical Exam   Constitutional: She is oriented to person, place, and time. She appears well-developed and well-nourished. No distress.   HENT:   Head: Normocephalic.   Mouth/Throat: No oropharyngeal exudate.   Neck: Neck supple. No thyromegaly present.   Cardiovascular: Normal rate, regular rhythm and normal heart sounds.   No murmur heard.  Pulmonary/Chest: Effort normal and breath sounds normal. She has no wheezes. She has no rales.   Abdominal: Soft. She exhibits no distension.   Musculoskeletal: She exhibits no edema.   Lymphadenopathy:     She has no cervical adenopathy.   Neurological: She is alert and oriented to person, place, and time.   Skin: Skin is warm and dry. No rash noted. She is not diaphoretic.   Psychiatric: She has a normal mood and affect. Her behavior is normal. Judgment and thought content normal.   Nursing note and vitals reviewed.      Assessment:       1. Acoustic neuroma    2. Anxiety and depression    3. Hives    4. Dermatitis        Plan:   Acoustic " neuroma  Comments:  Status post surgery with residual facial weakness on the left side    Anxiety and depression  Comments:  Refill on Cymbalta but only 60 mg q.day.  Short-term refill on Klonopin, but will need to find a no other means of prescribing in the future    Hives  Comments:  Refills given for a month on ranitidine and Claritin.  If problem week occurs thereafter need to have a further evaluated with an allergist    Dermatitis  -     ranitidine (ZANTAC) 150 MG tablet; Take 1 tablet (150 mg total) by mouth 2 (two) times daily. for 7 days  Dispense: 60 tablet; Refill: 1  -     loratadine (CLARITIN) 10 mg tablet; Take 1 tablet (10 mg total) by mouth once daily. for 7 days  Dispense: 30 tablet; Refill: 1    Other orders  -     clonazePAM (KLONOPIN) 0.5 MG tablet; Take 1 tablet (0.5 mg total) by mouth 2 (two) times daily as needed for Anxiety.  Dispense: 30 tablet; Refill: 0  -     DULoxetine (CYMBALTA) 60 MG capsule; Take 1 capsule (60 mg total) by mouth once daily.  Dispense: 30 capsule; Refill: 1

## 2018-09-13 NOTE — TELEPHONE ENCOUNTER
Spoke with patient she stated that the medicine that Dr. Hall gave her today did not get filled correct. I hung up with patient and called the pharmacy and they stated that she picked up the ones from Dr. Teague not Dr. Hall and she can come back and get her Cymbalta 60 mg 1 po daily disp#30 refill x 1 and her Klonopin 0.5 mg 1 po bid disp 330 is ready and she can not get the Zantac 150 mg 1 po bid till Saturday pt verbalized understanding and will go back to  her correct medicine today.

## 2018-09-13 NOTE — TELEPHONE ENCOUNTER
----- Message from Mariela Lou sent at 9/13/2018  4:55 PM CDT -----  Contact: self [phone]  Would like to consult with nurse regarding questions about medication.  Please call back at [phone].  Md Justine

## 2018-10-01 ENCOUNTER — TELEPHONE (OUTPATIENT)
Dept: FAMILY MEDICINE | Facility: CLINIC | Age: 38
End: 2018-10-01

## 2018-10-01 NOTE — TELEPHONE ENCOUNTER
Spoke with pt, advised her that she will need to check with her pharmacy to see which alternatives would be covered by insurance. She verbalized understanding.

## 2018-10-01 NOTE — TELEPHONE ENCOUNTER
Spoke with pt, she states that she cannot afford Claritin and would like an alternative prescribed. States that her pharmacy did not give other alternatives. Please advise?

## 2018-10-01 NOTE — TELEPHONE ENCOUNTER
----- Message from Ericka Crockett sent at 10/1/2018  1:11 PM CDT -----  Patient needs call back rg medication..[phone]

## 2019-03-19 ENCOUNTER — TELEPHONE (OUTPATIENT)
Dept: FAMILY MEDICINE | Facility: CLINIC | Age: 39
End: 2019-03-19

## 2019-03-19 NOTE — TELEPHONE ENCOUNTER
----- Message from Nitza Nelson sent at 3/19/2019  4:00 PM CDT -----  Contact: Pt   Type:  Sooner Apoointment Request    Caller is requesting a sooner appointment.  Caller declined first available appointment listed below.  Caller will not accept being placed on the waitlist and is requesting a message be sent to doctor.  Name of Caller: [facility]   When is the first available appointment?04/22  Symptoms:wants to be tested for Fibromyalgia   Would the patient rather a call back or a response via [facility]? Phone    Best Call Back Number:[phone]  Additional Information:

## 2019-09-06 ENCOUNTER — TELEPHONE (OUTPATIENT)
Dept: FAMILY MEDICINE | Facility: CLINIC | Age: 39
End: 2019-09-06

## 2019-09-06 ENCOUNTER — OFFICE VISIT (OUTPATIENT)
Dept: FAMILY MEDICINE | Facility: CLINIC | Age: 39
End: 2019-09-06
Payer: MEDICAID

## 2019-09-06 ENCOUNTER — HOSPITAL ENCOUNTER (OUTPATIENT)
Dept: RADIOLOGY | Facility: HOSPITAL | Age: 39
Discharge: HOME | End: 2019-09-06
Attending: NURSE PRACTITIONER
Payer: MEDICAID

## 2019-09-06 VITALS
DIASTOLIC BLOOD PRESSURE: 71 MMHG | WEIGHT: 201.81 LBS | OXYGEN SATURATION: 96 % | SYSTOLIC BLOOD PRESSURE: 107 MMHG | BODY MASS INDEX: 31.61 KG/M2 | TEMPERATURE: 99 F | HEART RATE: 77 BPM

## 2019-09-06 DIAGNOSIS — G89.29 CHRONIC MIDLINE LOW BACK PAIN WITHOUT SCIATICA: Primary | ICD-10-CM

## 2019-09-06 DIAGNOSIS — G89.29 CHRONIC MIDLINE LOW BACK PAIN WITHOUT SCIATICA: ICD-10-CM

## 2019-09-06 DIAGNOSIS — M54.50 CHRONIC MIDLINE LOW BACK PAIN WITHOUT SCIATICA: Primary | ICD-10-CM

## 2019-09-06 DIAGNOSIS — M54.50 CHRONIC MIDLINE LOW BACK PAIN WITHOUT SCIATICA: ICD-10-CM

## 2019-09-06 PROCEDURE — 72070 X-RAY EXAM THORAC SPINE 2VWS: CPT | Mod: TC,PO

## 2019-09-06 PROCEDURE — 99999 PR PBB SHADOW E&M-EST. PATIENT-LVL IV: CPT | Mod: PBBFAC,,, | Performed by: NURSE PRACTITIONER

## 2019-09-06 PROCEDURE — 72070 X-RAY EXAM THORAC SPINE 2VWS: CPT | Mod: 26,,, | Performed by: RADIOLOGY

## 2019-09-06 PROCEDURE — 72100 XR LUMBAR SPINE AP AND LATERAL: ICD-10-PCS | Mod: 26,,, | Performed by: RADIOLOGY

## 2019-09-06 PROCEDURE — 72070 XR THORACIC SPINE AP LATERAL: ICD-10-PCS | Mod: 26,,, | Performed by: RADIOLOGY

## 2019-09-06 PROCEDURE — 99214 PR OFFICE/OUTPT VISIT, EST, LEVL IV, 30-39 MIN: ICD-10-PCS | Mod: S$PBB,,, | Performed by: NURSE PRACTITIONER

## 2019-09-06 PROCEDURE — 72100 X-RAY EXAM L-S SPINE 2/3 VWS: CPT | Mod: 26,,, | Performed by: RADIOLOGY

## 2019-09-06 PROCEDURE — 99999 PR PBB SHADOW E&M-EST. PATIENT-LVL IV: ICD-10-PCS | Mod: PBBFAC,,, | Performed by: NURSE PRACTITIONER

## 2019-09-06 PROCEDURE — 99214 OFFICE O/P EST MOD 30 MIN: CPT | Mod: PBBFAC,25,PO | Performed by: NURSE PRACTITIONER

## 2019-09-06 PROCEDURE — 72100 X-RAY EXAM L-S SPINE 2/3 VWS: CPT | Mod: TC,PO

## 2019-09-06 PROCEDURE — 99214 OFFICE O/P EST MOD 30 MIN: CPT | Mod: S$PBB,,, | Performed by: NURSE PRACTITIONER

## 2019-09-06 RX ORDER — GABAPENTIN 300 MG/1
300 CAPSULE ORAL 2 TIMES DAILY
Refills: 2 | COMMUNITY
Start: 2019-08-15 | End: 2020-08-03

## 2019-09-06 RX ORDER — ESCITALOPRAM OXALATE 10 MG/1
1 TABLET ORAL DAILY
COMMUNITY
Start: 2019-03-08 | End: 2020-08-03

## 2019-09-06 RX ORDER — CLONAZEPAM 1 MG/1
TABLET ORAL
COMMUNITY
Start: 2019-07-17 | End: 2020-04-15 | Stop reason: SDUPTHER

## 2019-09-06 RX ORDER — QUETIAPINE FUMARATE 50 MG/1
TABLET, FILM COATED ORAL
COMMUNITY
Start: 2019-07-17 | End: 2021-04-12
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
## 2019-09-06 NOTE — TELEPHONE ENCOUNTER
----- Message from Quinn Castillo sent at 9/6/2019  9:18 AM CDT -----  Contact: pt   .Type:  Same Day Appointment Request    Caller is requesting a same day appointment.  Caller declined first available appointment listed below.    Name of Caller: pt   When is the first available appointment? 10/28/19   Symptoms: lower back pain   Best Call Back Number: .[phone]    Additional Information: pt went to ER and was told to follow up today

## 2019-09-06 NOTE — TELEPHONE ENCOUNTER
Spoke with pt, informed her that Dr. Siegel does not have any openings this morning but offered appt with Amanda. Pt accepted 1:20 pm appointment.   Pt states she went to [facility] ER for back pain and was told to f/u with pcp today.

## 2020-02-14 ENCOUNTER — TELEPHONE (OUTPATIENT)
Dept: FAMILY MEDICINE | Facility: CLINIC | Age: 40
End: 2020-02-14

## 2020-02-14 DIAGNOSIS — Z01.419 WELL WOMAN EXAM: Primary | ICD-10-CM

## 2020-02-14 DIAGNOSIS — R41.3 MEMORY LOSS: Primary | ICD-10-CM

## 2020-02-14 NOTE — TELEPHONE ENCOUNTER
Spoke with pt, scheduled pap smear with Dr. Siegel on 2/18/20 at 2:20 pm. Pt verbalized understanding.

## 2020-02-14 NOTE — TELEPHONE ENCOUNTER
----- Message from Guera Carreon sent at 2/14/2020 10:49 AM CST -----  Contact: pt  Pt is requesting a call back from the nurse in regards to pt referral for mamogram. Please call back at [phone].

## 2020-02-14 NOTE — TELEPHONE ENCOUNTER
Spoke with pt, she states that she would like pap smear done by Dr. Siegel. No need for referral. She will wait until she is 40 for mammogram.    Also, pt states she would like a referral for Neurology for memory issues after having tumor removed in 2018.

## 2020-02-14 NOTE — TELEPHONE ENCOUNTER
----- Message from Guera Carreon sent at 2/14/2020 11:05 AM CST -----  Contact: pt  Type:  Patient Requesting Referral    Who Called: Jeannine Steiner  Does the patient already have the specialty appointment scheduled?:No  If yes, what is the date of that appointment?:  Referral to What Specialty: [facility]  Reason for Referral:Pap smear  Does the patient want the referral with a specific physician?: No  [facility]  Patient Requesting a Response?: Yes  Would the patient rather a call back or a response via [facility]? Call  Best Call Back Number: [phone]   Additional Information:

## 2020-02-17 ENCOUNTER — TELEPHONE (OUTPATIENT)
Dept: NEUROSURGERY | Facility: CLINIC | Age: 40
End: 2020-02-17

## 2020-02-17 NOTE — TELEPHONE ENCOUNTER
Called patient left message to return call to discuss further X-rays noted in Epic for Low back pain.     ----- Message from John Hollins PA-C sent at 2/17/2020  7:27 AM CST -----  Contact: Pt   Hey,     Can you reach out to this patient and see what her complaint is and schedule her with our dept if needed?    Thanks  ----- Message -----  From: Guera Carreon  Sent: 2/14/2020  11:02 AM CST  To: John Hollins PA-C    Pt is requesting a call back from the nurse in regards to pt would like to schedule appt pt is a new pt. Please call back at [phone]

## 2020-02-18 ENCOUNTER — TELEPHONE (OUTPATIENT)
Dept: FAMILY MEDICINE | Facility: CLINIC | Age: 40
End: 2020-02-18

## 2020-02-18 NOTE — TELEPHONE ENCOUNTER
----- Message from Mary Stokes sent at 2/18/2020  3:12 PM CST -----  Contact: pt   .Type:  Patient Returning Call    Who Called:pt  Who Left Message for Patient:nurse  Does the patient know what this is regarding?:appt  Would the patient rather a call back or a response via [facility]? Call back  Best Call Back Number:[phone]  Additional Information:

## 2020-02-19 NOTE — TELEPHONE ENCOUNTER
Spoke w/ patient she states that an referral was submitted for memory loss, however I advised patient this is the neurosurgery dept we don't treat memory loss.       Patient informed she needs to see an neurologist and given [phone] to reach out to the On license of [facility] location for schedule process w/ that dept.

## 2020-02-27 ENCOUNTER — TELEPHONE (OUTPATIENT)
Dept: NEUROLOGY | Facility: CLINIC | Age: 40
End: 2020-02-27

## 2020-02-27 NOTE — TELEPHONE ENCOUNTER
----- Message from Mary Stokes sent at 2/27/2020  4:42 PM CST -----  Contact: pt   Pt is currently trying to schedule from referral Robbin put in for her for memory loss, but schedule will not come up. Please give a call back at [phone].            Thanks,  Mary SMYTH

## 2020-03-03 ENCOUNTER — LAB VISIT (OUTPATIENT)
Dept: LAB | Facility: HOSPITAL | Age: 40
End: 2020-03-03
Attending: FAMILY MEDICINE
Payer: MEDICAID

## 2020-03-03 ENCOUNTER — OFFICE VISIT (OUTPATIENT)
Dept: FAMILY MEDICINE | Facility: CLINIC | Age: 40
End: 2020-03-03
Attending: FAMILY MEDICINE
Payer: MEDICAID

## 2020-03-03 VITALS
OXYGEN SATURATION: 98 % | DIASTOLIC BLOOD PRESSURE: 80 MMHG | WEIGHT: 206.44 LBS | SYSTOLIC BLOOD PRESSURE: 124 MMHG | TEMPERATURE: 99 F | HEART RATE: 91 BPM | HEIGHT: 67 IN | BODY MASS INDEX: 32.4 KG/M2

## 2020-03-03 DIAGNOSIS — Z71.1 CONCERN ABOUT STD IN FEMALE WITHOUT DIAGNOSIS: Primary | ICD-10-CM

## 2020-03-03 DIAGNOSIS — Z72.0 TOBACCO ABUSE: ICD-10-CM

## 2020-03-03 DIAGNOSIS — Z71.1 CONCERN ABOUT STD IN FEMALE WITHOUT DIAGNOSIS: ICD-10-CM

## 2020-03-03 PROCEDURE — 99213 PR OFFICE/OUTPT VISIT, EST, LEVL III, 20-29 MIN: ICD-10-PCS | Mod: 25,S$PBB,, | Performed by: FAMILY MEDICINE

## 2020-03-03 PROCEDURE — 90471 IMMUNIZATION ADMIN: CPT | Mod: PBBFAC,PO

## 2020-03-03 PROCEDURE — 86703 HIV-1/HIV-2 1 RESULT ANTBDY: CPT

## 2020-03-03 PROCEDURE — 99999 PR PBB SHADOW E&M-EST. PATIENT-LVL III: CPT | Mod: PBBFAC,,, | Performed by: FAMILY MEDICINE

## 2020-03-03 PROCEDURE — 99999 PR PBB SHADOW E&M-EST. PATIENT-LVL III: ICD-10-PCS | Mod: PBBFAC,,, | Performed by: FAMILY MEDICINE

## 2020-03-03 PROCEDURE — 86696 HERPES SIMPLEX TYPE 2 TEST: CPT

## 2020-03-03 PROCEDURE — 99213 OFFICE O/P EST LOW 20 MIN: CPT | Mod: 25,S$PBB,, | Performed by: FAMILY MEDICINE

## 2020-03-03 PROCEDURE — 36415 COLL VENOUS BLD VENIPUNCTURE: CPT | Mod: PO

## 2020-03-03 PROCEDURE — 99213 OFFICE O/P EST LOW 20 MIN: CPT | Mod: PBBFAC,PO | Performed by: FAMILY MEDICINE

## 2020-03-03 RX ORDER — VALACYCLOVIR HYDROCHLORIDE 500 MG/1
500 TABLET, FILM COATED ORAL 2 TIMES DAILY
Qty: 6 TABLET | Refills: 0 | Status: SHIPPED | OUTPATIENT
Start: 2020-03-03 | End: 2020-07-27 | Stop reason: SDUPTHER

## 2020-03-03 RX ORDER — CARBOXYMETHYLCELLULOSE SODIUM 10 MG/ML
2 GEL OPHTHALMIC 4 TIMES DAILY
COMMUNITY

## 2020-03-03 RX ORDER — VALACYCLOVIR HYDROCHLORIDE 500 MG/1
500 TABLET, FILM COATED ORAL DAILY
Qty: 90 TABLET | Refills: 0 | Status: SHIPPED | OUTPATIENT
Start: 2020-03-03 | End: 2020-04-02

## 2020-03-03 NOTE — PROGRESS NOTES
Subjective:       Patient ID: Jeannine Steiner is a 39 y.o. female.    Chief Complaint: Gynecologic Exam    39 y old female with tobacco abuse  And  hx of genital ulcers here for f.u . She had PCR for herpes done 3 y ago which was neg . Taking Valtrex 500 mg daily until 1 m ago . Now with lesion on posterior vaginal introitus . She has not been Sexually  active in 2 y . Will like to stop smoking     Review of Systems   Constitutional: Negative.    HENT: Negative.    Eyes: Negative.    Respiratory: Negative.    Cardiovascular: Negative.    Gastrointestinal: Negative.    Genitourinary: Negative.    Musculoskeletal: Negative.    Skin: Negative.    Hematological: Negative.        Objective:      Physical Exam   Constitutional: She is oriented to person, place, and time. No distress.   HENT:   Head: Normocephalic and atraumatic.   Right Ear: External ear normal.   Left Ear: External ear normal.   Mouth/Throat: No oropharyngeal exudate.   Eyes: Pupils are equal, round, and reactive to light. Conjunctivae and EOM are normal. Right eye exhibits no discharge. Left eye exhibits no discharge. No scleral icterus.   Neck: Normal range of motion. Neck supple. No JVD present. No tracheal deviation present. No thyromegaly present.   Cardiovascular: Normal rate, regular rhythm and normal heart sounds. Exam reveals no gallop and no friction rub.   No murmur heard.  Pulmonary/Chest: Effort normal and breath sounds normal. No stridor. No respiratory distress. She has no wheezes. She has no rales. She exhibits no tenderness.   Abdominal: Soft. Bowel sounds are normal. She exhibits no distension. There is no tenderness. There is no rebound and no guarding.   Musculoskeletal: Normal range of motion.   Lymphadenopathy:     She has no cervical adenopathy.   Neurological: She is alert and oriented to person, place, and time.   Skin: Skin is warm and dry. She is not diaphoretic.   Psychiatric: She has a normal mood and affect. Her  behavior is normal. Judgment and thought content normal.       Assessment:      Jeannine was seen today for gynecologic exam.    Diagnoses and all orders for this visit:    Concern about STD in female without diagnosis  -     Herpes Simplex Virus (HSV) Types 1 & 2, IgG, Herpes Titer; Future  -     HIV 1/2 Ag/Ab (4th Gen); Future    Tobacco abuse  -     Ambulatory referral/consult to Smoking Cessation Program; Future    Other orders  -     (In Office Administered) Pneumococcal Polysaccharide Vaccine (23 Valent) (SQ/IM)  -     valACYclovir (VALTREX) 500 MG tablet; Take 1 tablet (500 mg total) by mouth once daily.  -     valACYclovir (VALTREX) 500 MG tablet; Take 1 tablet (500 mg total) by mouth 2 (two) times daily.      Plan:     Jeannine was seen today for gynecologic exam.    Diagnoses and all orders for this visit:    Concern about STD in female without diagnosis  -     Herpes Simplex Virus (HSV) Types 1 & 2, IgG, Herpes Titer; Future  -     HIV 1/2 Ag/Ab (4th Gen); Future    Other orders  -     (In Office Administered) Pneumococcal Polysaccharide Vaccine (23 Valent) (SQ/IM)  -     valACYclovir (VALTREX) 500 MG tablet; Take 1 tablet (500 mg total) by mouth once daily.  -     valACYclovir (VALTREX) 500 MG tablet; Take 1 tablet (500 mg total) by mouth 2 (two) times daily.     STD   Stop smoking

## 2020-03-04 LAB — HIV 1+2 AB+HIV1 P24 AG SERPL QL IA: NEGATIVE

## 2020-03-06 LAB
HSV1 IGG SERPL QL IA: NEGATIVE
HSV2 IGG SERPL QL IA: POSITIVE

## 2020-03-10 ENCOUNTER — CLINICAL SUPPORT (OUTPATIENT)
Dept: SMOKING CESSATION | Facility: CLINIC | Age: 40
End: 2020-03-10
Payer: MEDICAID

## 2020-03-10 DIAGNOSIS — F17.200 NICOTINE DEPENDENCE: Primary | ICD-10-CM

## 2020-03-10 PROCEDURE — 99404 PR PREVENT COUNSEL,INDIV,60 MIN: ICD-10-PCS | Mod: S$PBB,,, | Performed by: GENERAL PRACTICE

## 2020-03-10 PROCEDURE — 99999 PR PBB SHADOW E&M-EST. PATIENT-LVL I: CPT | Mod: PBBFAC,,,

## 2020-03-10 PROCEDURE — 99404 PREV MED CNSL INDIV APPRX 60: CPT | Mod: S$PBB,,, | Performed by: GENERAL PRACTICE

## 2020-03-10 PROCEDURE — 99999 PR PBB SHADOW E&M-EST. PATIENT-LVL I: ICD-10-PCS | Mod: PBBFAC,,,

## 2020-03-10 PROCEDURE — 99211 OFF/OP EST MAY X REQ PHY/QHP: CPT | Mod: PBBFAC | Performed by: GENERAL PRACTICE

## 2020-03-10 RX ORDER — IBUPROFEN 200 MG
1 TABLET ORAL DAILY
Qty: 14 PATCH | Refills: 1 | Status: SHIPPED | OUTPATIENT
Start: 2020-03-10 | End: 2020-07-08 | Stop reason: ALTCHOICE

## 2020-03-10 NOTE — PROGRESS NOTES
.   VSS. On RA. Lungs have expiratory wheezes present at bases. Alert and interactive with mother and staff. Good oral intake, one wet diaper this shift. IV removed. First dose of amoxicillin given and flu vaccine. Mother is attentive to patients needs.

## 2020-03-17 ENCOUNTER — CLINICAL SUPPORT (OUTPATIENT)
Dept: SMOKING CESSATION | Facility: CLINIC | Age: 40
End: 2020-03-17

## 2020-03-17 DIAGNOSIS — F17.200 NICOTINE DEPENDENCE: Primary | ICD-10-CM

## 2020-03-17 PROCEDURE — 99999 PR PBB SHADOW E&M-EST. PATIENT-LVL I: CPT | Mod: PBBFAC,,,

## 2020-03-17 PROCEDURE — 99402 PREV MED CNSL INDIV APPRX 30: CPT | Mod: S$GLB,,, | Performed by: GENERAL PRACTICE

## 2020-03-17 PROCEDURE — 99999 PR PBB SHADOW E&M-EST. PATIENT-LVL I: ICD-10-PCS | Mod: PBBFAC,,,

## 2020-03-17 PROCEDURE — 99402 PR PREVENT COUNSEL,INDIV,30 MIN: ICD-10-PCS | Mod: S$GLB,,, | Performed by: GENERAL PRACTICE

## 2020-03-17 NOTE — PROGRESS NOTES
Individual Follow-Up Form    3/17/2020    Quit Date: 3-    Clinical Status of Patient: Outpatient    Length of Service: 30 minutes    Continuing Medication: yes  Patches    Comments: Spoke with patient by telephone at length in regards to her smoking cessation progress. She states that she has successfully reached her target quit date. She continues to use the 14 mg nicotine patches daily with no adverse side effects noted. She states that her friend gave her some nicotine lozenges to use to help with urges. She only had a few nicotine lozenges and is inquiring about an order. Discussed proper use of nicotine lozenges and possible side effects. Discussed use with the nicotine patches. She states that she will only use the nicotine lozenges when she is around other smokers. Discussed coping strategies. Discussed weekly progress update appointments. She verbalized understanding. The patient denies any abnormal behavioral or mental changes at this time. Will continue to encourage and monitor progress.    Diagnosis: F17.200    Next Visit: 1 week

## 2020-03-20 RX ORDER — CLONAZEPAM 1 MG/1
TABLET ORAL
Status: CANCELLED | OUTPATIENT
Start: 2020-03-20

## 2020-04-15 RX ORDER — CLONAZEPAM 1 MG/1
TABLET ORAL
Qty: 60 TABLET | Refills: 0 | Status: SHIPPED | OUTPATIENT
Start: 2020-04-15 | End: 2021-04-12

## 2020-04-15 NOTE — TELEPHONE ENCOUNTER
Spoke with patient she stated that she had to move to a different location with her Psychiatrist and she has seen the intake nurse on March 17,2020 but does not see the Psychiatrist until the middle of May. Patient wanted to know if you would refill her Klonopin for 1 month until she see them.

## 2020-04-15 NOTE — TELEPHONE ENCOUNTER
Patient notified refill request was approved for this one time only. Patient verbalized understanding and will  RX today

## 2020-04-15 NOTE — TELEPHONE ENCOUNTER
----- Message from Kristin Araiza sent at 4/15/2020  4:02 PM CDT -----  Pt calling to request refill for clonazePAM (KLONOPIN) 1 MG tablet bc psychiatrist doesn't have appts until May    Pt is requesting a 30 day supply    Pt contact [phone]

## 2020-04-29 ENCOUNTER — CLINICAL SUPPORT (OUTPATIENT)
Dept: SMOKING CESSATION | Facility: CLINIC | Age: 40
End: 2020-04-29

## 2020-04-29 DIAGNOSIS — F17.200 NICOTINE DEPENDENCE: Primary | ICD-10-CM

## 2020-04-29 PROCEDURE — 99407 BEHAV CHNG SMOKING > 10 MIN: CPT | Mod: S$GLB,,, | Performed by: GENERAL PRACTICE

## 2020-04-29 PROCEDURE — 99407 PR TOBACCO USE CESSATION INTENSIVE >10 MINUTES: ICD-10-PCS | Mod: S$GLB,,, | Performed by: GENERAL PRACTICE

## 2020-07-08 ENCOUNTER — CLINICAL SUPPORT (OUTPATIENT)
Dept: SMOKING CESSATION | Facility: CLINIC | Age: 40
End: 2020-07-08

## 2020-07-08 DIAGNOSIS — F17.200 NICOTINE DEPENDENCE: Primary | ICD-10-CM

## 2020-07-08 PROCEDURE — 99999 PR PBB SHADOW E&M-EST. PATIENT-LVL I: ICD-10-PCS | Mod: PBBFAC,,,

## 2020-07-08 PROCEDURE — 99404 PR PREVENT COUNSEL,INDIV,60 MIN: ICD-10-PCS | Mod: S$GLB,,, | Performed by: GENERAL PRACTICE

## 2020-07-08 PROCEDURE — 99999 PR PBB SHADOW E&M-EST. PATIENT-LVL I: CPT | Mod: PBBFAC,,,

## 2020-07-08 PROCEDURE — 99404 PREV MED CNSL INDIV APPRX 60: CPT | Mod: S$GLB,,, | Performed by: GENERAL PRACTICE

## 2020-07-08 RX ORDER — MICONAZOLE NITRATE 2 %
2 CREAM (GRAM) TOPICAL
Qty: 100 EACH | Refills: 1 | Status: SHIPPED | OUTPATIENT
Start: 2020-07-08 | End: 2020-08-18

## 2020-07-08 RX ORDER — IBUPROFEN 200 MG
1 TABLET ORAL DAILY
Qty: 14 PATCH | Refills: 0 | Status: SHIPPED | OUTPATIENT
Start: 2020-07-08 | End: 2020-07-20 | Stop reason: DRUGHIGH

## 2020-07-13 ENCOUNTER — TELEPHONE (OUTPATIENT)
Dept: FAMILY MEDICINE | Facility: CLINIC | Age: 40
End: 2020-07-13

## 2020-07-13 NOTE — TELEPHONE ENCOUNTER
----- Message from Ivonne Ball sent at 7/13/2020  1:33 PM CDT -----  Regarding: reschedule  Contact: pt  Pt's car is still in the shop. Can her appt be rescheduled for tomorrow? [phone]

## 2020-07-13 NOTE — TELEPHONE ENCOUNTER
----- Message from Ashley Velasquez MA sent at 7/13/2020  1:44 PM CDT -----  Regarding: FW: reschedule  Contact: pt  Can you schedule her for wednesday at 3 pm  for nausea  ----- Message -----  From: Ivonne Ball  Sent: 7/13/2020   1:33 PM CDT  To: Gabino MATHEWS Staff  Subject: reschedule                                       Pt's car is still in the shop. Can her appt be rescheduled for tomorrow? [phone]

## 2020-07-13 NOTE — TELEPHONE ENCOUNTER
----- Message from Dragan Alarcon sent at 7/13/2020  9:19 AM CDT -----  Contact: self  Type:  Sooner Apoointment Request    Caller is requesting a sooner appointment.  Caller declined first available appointment listed below.  Caller will not accept being placed on the waitlist and is requesting a message be sent to doctor.  Name of Caller:Jeannine Purcellee Jatin   When is the first available appointment?2020  Symptoms:unable to stay focused/nausea  Would the patient rather a call back or a response via [facility]? Call back  Best Call Back Number:[phone]  Additional Information:

## 2020-07-15 ENCOUNTER — OFFICE VISIT (OUTPATIENT)
Dept: FAMILY MEDICINE | Facility: CLINIC | Age: 40
End: 2020-07-15
Payer: MEDICAID

## 2020-07-15 ENCOUNTER — LAB VISIT (OUTPATIENT)
Dept: LAB | Facility: HOSPITAL | Age: 40
End: 2020-07-15
Attending: FAMILY MEDICINE
Payer: MEDICAID

## 2020-07-15 ENCOUNTER — CLINICAL SUPPORT (OUTPATIENT)
Dept: SMOKING CESSATION | Facility: CLINIC | Age: 40
End: 2020-07-15

## 2020-07-15 VITALS
BODY MASS INDEX: 29.56 KG/M2 | TEMPERATURE: 99 F | HEART RATE: 87 BPM | SYSTOLIC BLOOD PRESSURE: 109 MMHG | DIASTOLIC BLOOD PRESSURE: 68 MMHG | OXYGEN SATURATION: 96 % | WEIGHT: 188.69 LBS

## 2020-07-15 DIAGNOSIS — R11.0 NAUSEA: ICD-10-CM

## 2020-07-15 DIAGNOSIS — F41.9 ANXIETY AND DEPRESSION: ICD-10-CM

## 2020-07-15 DIAGNOSIS — R06.83 SNORING: ICD-10-CM

## 2020-07-15 DIAGNOSIS — R41.840 DIFFICULTY CONCENTRATING: ICD-10-CM

## 2020-07-15 DIAGNOSIS — R53.83 FATIGUE, UNSPECIFIED TYPE: ICD-10-CM

## 2020-07-15 DIAGNOSIS — F51.04 PSYCHOPHYSIOLOGICAL INSOMNIA: ICD-10-CM

## 2020-07-15 DIAGNOSIS — F32.A ANXIETY AND DEPRESSION: ICD-10-CM

## 2020-07-15 DIAGNOSIS — Z12.39 BREAST CANCER SCREENING: ICD-10-CM

## 2020-07-15 DIAGNOSIS — F17.200 NICOTINE DEPENDENCE: Primary | ICD-10-CM

## 2020-07-15 DIAGNOSIS — R53.83 FATIGUE, UNSPECIFIED TYPE: Primary | ICD-10-CM

## 2020-07-15 LAB
ALBUMIN SERPL BCP-MCNC: 4.1 G/DL (ref 3.5–5.2)
ALP SERPL-CCNC: 59 U/L (ref 55–135)
ALT SERPL W/O P-5'-P-CCNC: 20 U/L (ref 10–44)
ANION GAP SERPL CALC-SCNC: 6 MMOL/L (ref 8–16)
AST SERPL-CCNC: 23 U/L (ref 10–40)
BASOPHILS # BLD AUTO: 0.08 K/UL (ref 0–0.2)
BASOPHILS NFR BLD: 0.8 % (ref 0–1.9)
BILIRUB SERPL-MCNC: 0.7 MG/DL (ref 0.1–1)
BUN SERPL-MCNC: 13 MG/DL (ref 6–20)
CALCIUM SERPL-MCNC: 9.5 MG/DL (ref 8.7–10.5)
CHLORIDE SERPL-SCNC: 105 MMOL/L (ref 95–110)
CO2 SERPL-SCNC: 25 MMOL/L (ref 23–29)
CREAT SERPL-MCNC: 0.9 MG/DL (ref 0.5–1.4)
DIFFERENTIAL METHOD: ABNORMAL
EOSINOPHIL # BLD AUTO: 0.2 K/UL (ref 0–0.5)
EOSINOPHIL NFR BLD: 1.7 % (ref 0–8)
ERYTHROCYTE [DISTWIDTH] IN BLOOD BY AUTOMATED COUNT: 12.7 % (ref 11.5–14.5)
EST. GFR  (AFRICAN AMERICAN): >60 ML/MIN/1.73 M^2
EST. GFR  (NON AFRICAN AMERICAN): >60 ML/MIN/1.73 M^2
FERRITIN SERPL-MCNC: 145 NG/ML (ref 20–300)
GLUCOSE SERPL-MCNC: 84 MG/DL (ref 70–110)
HCT VFR BLD AUTO: 43.7 % (ref 37–48.5)
HGB BLD-MCNC: 14.1 G/DL (ref 12–16)
IMM GRANULOCYTES # BLD AUTO: 0.06 K/UL (ref 0–0.04)
IMM GRANULOCYTES NFR BLD AUTO: 0.6 % (ref 0–0.5)
IRON SERPL-MCNC: 72 UG/DL (ref 30–160)
LYMPHOCYTES # BLD AUTO: 3.3 K/UL (ref 1–4.8)
LYMPHOCYTES NFR BLD: 31 % (ref 18–48)
MCH RBC QN AUTO: 32.3 PG (ref 27–31)
MCHC RBC AUTO-ENTMCNC: 32.3 G/DL (ref 32–36)
MCV RBC AUTO: 100 FL (ref 82–98)
MONOCYTES # BLD AUTO: 0.7 K/UL (ref 0.3–1)
MONOCYTES NFR BLD: 6.9 % (ref 4–15)
NEUTROPHILS # BLD AUTO: 6.2 K/UL (ref 1.8–7.7)
NEUTROPHILS NFR BLD: 59 % (ref 38–73)
NRBC BLD-RTO: 0 /100 WBC
PLATELET # BLD AUTO: 223 K/UL (ref 150–350)
PMV BLD AUTO: 11.3 FL (ref 9.2–12.9)
POTASSIUM SERPL-SCNC: 3.9 MMOL/L (ref 3.5–5.1)
PROT SERPL-MCNC: 8.3 G/DL (ref 6–8.4)
RBC # BLD AUTO: 4.36 M/UL (ref 4–5.4)
SATURATED IRON: 19 % (ref 20–50)
SODIUM SERPL-SCNC: 136 MMOL/L (ref 136–145)
TOTAL IRON BINDING CAPACITY: 382 UG/DL (ref 250–450)
TRANSFERRIN SERPL-MCNC: 258 MG/DL (ref 200–375)
TSH SERPL DL<=0.005 MIU/L-ACNC: 0.47 UIU/ML (ref 0.4–4)
WBC # BLD AUTO: 10.47 K/UL (ref 3.9–12.7)

## 2020-07-15 PROCEDURE — 84443 ASSAY THYROID STIM HORMONE: CPT

## 2020-07-15 PROCEDURE — 83540 ASSAY OF IRON: CPT

## 2020-07-15 PROCEDURE — 80053 COMPREHEN METABOLIC PANEL: CPT

## 2020-07-15 PROCEDURE — 99999 PR PBB SHADOW E&M-EST. PATIENT-LVL IV: CPT | Mod: PBBFAC,,, | Performed by: NURSE PRACTITIONER

## 2020-07-15 PROCEDURE — 82728 ASSAY OF FERRITIN: CPT

## 2020-07-15 PROCEDURE — 82306 VITAMIN D 25 HYDROXY: CPT

## 2020-07-15 PROCEDURE — 99214 OFFICE O/P EST MOD 30 MIN: CPT | Mod: S$PBB,,, | Performed by: NURSE PRACTITIONER

## 2020-07-15 PROCEDURE — 99999 PR PBB SHADOW E&M-EST. PATIENT-LVL IV: ICD-10-PCS | Mod: PBBFAC,,, | Performed by: NURSE PRACTITIONER

## 2020-07-15 PROCEDURE — 86677 HELICOBACTER PYLORI ANTIBODY: CPT

## 2020-07-15 PROCEDURE — 99214 OFFICE O/P EST MOD 30 MIN: CPT | Mod: PBBFAC,PO | Performed by: NURSE PRACTITIONER

## 2020-07-15 PROCEDURE — 36415 COLL VENOUS BLD VENIPUNCTURE: CPT | Mod: PO

## 2020-07-15 PROCEDURE — 99214 PR OFFICE/OUTPT VISIT, EST, LEVL IV, 30-39 MIN: ICD-10-PCS | Mod: S$PBB,,, | Performed by: NURSE PRACTITIONER

## 2020-07-15 PROCEDURE — 99407 BEHAV CHNG SMOKING > 10 MIN: CPT | Mod: S$GLB,,, | Performed by: GENERAL PRACTICE

## 2020-07-15 PROCEDURE — 85025 COMPLETE CBC W/AUTO DIFF WBC: CPT

## 2020-07-15 PROCEDURE — 99407 PR TOBACCO USE CESSATION INTENSIVE >10 MINUTES: ICD-10-PCS | Mod: S$GLB,,, | Performed by: GENERAL PRACTICE

## 2020-07-15 RX ORDER — ONDANSETRON HYDROCHLORIDE 8 MG/1
8 TABLET, FILM COATED ORAL EVERY 8 HOURS PRN
Qty: 12 TABLET | Refills: 1 | Status: SHIPPED | OUTPATIENT
Start: 2020-07-15 | End: 2020-07-15 | Stop reason: SDUPTHER

## 2020-07-15 RX ORDER — ONDANSETRON HYDROCHLORIDE 8 MG/1
8 TABLET, FILM COATED ORAL EVERY 8 HOURS PRN
Qty: 12 TABLET | Refills: 1 | Status: SHIPPED | OUTPATIENT
Start: 2020-07-15 | End: 2020-08-03

## 2020-07-16 ENCOUNTER — TELEPHONE (OUTPATIENT)
Dept: PULMONOLOGY | Facility: CLINIC | Age: 40
End: 2020-07-16

## 2020-07-16 LAB
25(OH)D3+25(OH)D2 SERPL-MCNC: 26 NG/ML (ref 30–96)
H PYLORI IGG SERPL QL IA: NEGATIVE

## 2020-07-16 NOTE — TELEPHONE ENCOUNTER
----- Message from Alessia Fink sent at 7/16/2020  7:27 AM CDT -----  [facility]

## 2020-07-20 DIAGNOSIS — F17.200 NICOTINE DEPENDENCE: Primary | ICD-10-CM

## 2020-07-20 RX ORDER — IBUPROFEN 200 MG
1 TABLET ORAL DAILY
Qty: 14 PATCH | Refills: 0 | Status: SHIPPED | OUTPATIENT
Start: 2020-07-20 | End: 2020-08-18

## 2020-07-22 NOTE — PROGRESS NOTES
Subjective:       Patient ID: Jeannine Steiner is a 40 y.o. female.    Chief Complaint: Nausea  Pt reports to clinic with chief complaint of fatigue, nausea and decreased concentration.  Onset 2-3 months ago.  Reports awakening fatigued.  Notes snoring.  Notes nausea for 2 months.  No vomiting, no dizziness.  Also notes decreased concentration.  Sees psychiatry for anxiety.  Non smoker. Monitors diet and exercise  HPI  Review of Systems   Constitutional: Positive for fatigue.   HENT: Negative.    Respiratory: Negative for choking, shortness of breath and wheezing.    Cardiovascular: Negative.    Gastrointestinal: Positive for nausea.   Genitourinary: Negative.    Musculoskeletal: Negative.    Psychiatric/Behavioral: Positive for decreased concentration.       Objective:      Physical Exam  Vitals signs reviewed.   Constitutional:       Appearance: Normal appearance.   HENT:      Head: Normocephalic.      Nose: Nose normal.   Eyes:      Extraocular Movements: Extraocular movements intact.      Pupils: Pupils are equal, round, and reactive to light.   Neck:      Musculoskeletal: Normal range of motion.   Cardiovascular:      Heart sounds: Normal heart sounds.   Pulmonary:      Effort: Pulmonary effort is normal.      Breath sounds: Normal breath sounds.   Abdominal:      Tenderness: There is no abdominal tenderness.   Musculoskeletal: Normal range of motion.   Skin:     General: Skin is warm and dry.   Neurological:      General: No focal deficit present.      Mental Status: She is alert and oriented to person, place, and time.   Psychiatric:         Mood and Affect: Mood normal.         Behavior: Behavior normal.         Assessment:       1. Fatigue, unspecified type    2. Anxiety and depression    3. Psychophysiological insomnia    4. Difficulty concentrating    5. Nausea    6. Snoring    7. Breast cancer screening        Plan:   Fatigue, unspecified type  -     CBC auto differential; Future; Expected date:  07/15/2020  -     Comprehensive metabolic panel; Future; Expected date: 07/15/2020  -     TSH; Future; Expected date: 07/15/2020  -     Iron and TIBC; Future; Expected date: 07/15/2020  -     Ferritin; Future; Expected date: 07/15/2020  -     Vitamin D; Future; Expected date: 07/15/2020    Anxiety and depression  Managed per outside psychiatrist  Psychophysiological insomnia  Managed per outside psych  Difficulty concentrating    Nausea  -     Discontinue: ondansetron (ZOFRAN) 8 MG tablet; Take 1 tablet (8 mg total) by mouth every 8 (eight) hours as needed for Nausea.  Dispense: 12 tablet; Refill: 1  -     H. PYLORI ANTIBODY, IGG; Future; Expected date: 07/15/2020  -     ondansetron (ZOFRAN) 8 MG tablet; Take 1 tablet (8 mg total) by mouth every 8 (eight) hours as needed for Nausea.  Dispense: 12 tablet; Refill: 1    Snoring  -     Home Sleep Studies; Future    Breast cancer screening  -     Mammo Digital Screening Bilateral With CAD; Future; Expected date: 07/15/2020      No follow-ups on file.

## 2020-07-23 ENCOUNTER — CLINICAL SUPPORT (OUTPATIENT)
Dept: SMOKING CESSATION | Facility: CLINIC | Age: 40
End: 2020-07-23

## 2020-07-23 DIAGNOSIS — F17.200 NICOTINE DEPENDENCE: Primary | ICD-10-CM

## 2020-07-23 PROCEDURE — 99402 PREV MED CNSL INDIV APPRX 30: CPT | Mod: S$GLB,,, | Performed by: GENERAL PRACTICE

## 2020-07-23 PROCEDURE — 99402 PR PREVENT COUNSEL,INDIV,30 MIN: ICD-10-PCS | Mod: S$GLB,,, | Performed by: GENERAL PRACTICE

## 2020-07-23 NOTE — PROGRESS NOTES
Individual Follow-Up Form    7/23/2020    Quit Date: 7-    Clinical Status of Patient: Outpatient    Length of Service: 30 minutes    Continuing Medication: yes  Patches    Comments: Spoke with patient at length in regards to her smoking cessation progress. She stated that she has successfully reached her target quit date,. She stated that the increased patch dose from 14 mg to 21 mg seems to be the right dose for her. She states that she was unable to get the prescription for nicotine gum filled due to out of pocket cost. She inquired about coupons or programs that would offer the gum for free. State quit line information given for the gum. She stated that her patches were covered at no cost and has not had any side effects. The patient denies any abnormal behavioral or mental changes at this time. She stated that she has not been eating as much lately and feels that she was eating instead of smoking. Discussed healthy eating habits and stocking up on healthy snacks. She verbalized understanding. Reviewed coping strategies for urges that she may experience. Will continue to encourage and monitor progress.    Diagnosis: F17.200    Next Visit: 2 weeks

## 2020-07-24 ENCOUNTER — PROCEDURE VISIT (OUTPATIENT)
Dept: SLEEP MEDICINE | Facility: CLINIC | Age: 40
End: 2020-07-24
Payer: MEDICAID

## 2020-07-24 ENCOUNTER — HOSPITAL ENCOUNTER (OUTPATIENT)
Dept: RADIOLOGY | Facility: HOSPITAL | Age: 40
Discharge: HOME | End: 2020-07-24
Attending: NURSE PRACTITIONER
Payer: MEDICAID

## 2020-07-24 VITALS — HEIGHT: 67 IN | BODY MASS INDEX: 29.62 KG/M2 | WEIGHT: 188.69 LBS

## 2020-07-24 DIAGNOSIS — R06.83 SNORING: Primary | ICD-10-CM

## 2020-07-24 DIAGNOSIS — Z12.39 BREAST CANCER SCREENING: ICD-10-CM

## 2020-07-24 PROCEDURE — 77063 MAMMO DIGITAL SCREENING BILAT WITH TOMOSYNTHESIS_CAD: ICD-10-PCS | Mod: 26,,, | Performed by: RADIOLOGY

## 2020-07-24 PROCEDURE — 95800 SLP STDY UNATTENDED: CPT | Mod: 26,S$PBB,, | Performed by: INTERNAL MEDICINE

## 2020-07-24 PROCEDURE — 95800 SLP STDY UNATTENDED: CPT | Mod: PBBFAC | Performed by: INTERNAL MEDICINE

## 2020-07-24 PROCEDURE — 77063 BREAST TOMOSYNTHESIS BI: CPT | Mod: 26,,, | Performed by: RADIOLOGY

## 2020-07-24 PROCEDURE — 77067 SCR MAMMO BI INCL CAD: CPT | Mod: 26,,, | Performed by: RADIOLOGY

## 2020-07-24 PROCEDURE — 77067 MAMMO DIGITAL SCREENING BILAT WITH TOMOSYNTHESIS_CAD: ICD-10-PCS | Mod: 26,,, | Performed by: RADIOLOGY

## 2020-07-24 PROCEDURE — 77067 SCR MAMMO BI INCL CAD: CPT | Mod: TC

## 2020-07-24 PROCEDURE — 95800 PR SLEEP STUDY, UNATTENDED, RECORD HEART RATE/O2 SAT/RESP ANAL/SLEEP TIME: ICD-10-PCS | Mod: 26,S$PBB,, | Performed by: INTERNAL MEDICINE

## 2020-07-24 NOTE — Clinical Note
PHYSICIAN INTERPRETATION AND COMMENTS: Clinically significant sleep disordered breathing is not identified.  overall AHI 0.0 events per hour. Total sleep time was 6.5 hr. Primary snoring. There is insufficient supine study time to assess the  severity of positional obstructive sleep apnea (ELPIDIO). Referred to [facility] Disorder Clinic for further evaluation and management.  ENT evaluation for snoring. If suspicion for sleep disorder breathing is high then repeat testing is indicated. Clinical notes indicate  patient has cycler physiological insomnia. The appropriate study in this case would be an in-lab polysomnography.  CLINICAL HISTORY: 40 year old female presented with: 14.5 inch neck, BMI of 27, an [place] sleepiness score of 2, history  of depression and symptoms of nocturnal snoring, waking up choking and witnessed apneas. Based on the clinical history, the patient  has a high pre-test probability of having mild ELPIDIO.

## 2020-07-24 NOTE — PROCEDURES
Home Sleep Studies    Date/Time: 7/24/2020 8:00 AM  Performed by: Adin Olmstead MD  Authorized by: Amanda Lloyd NP       PHYSICIAN INTERPRETATION AND COMMENTS: Clinically significant sleep disordered breathing is not identified.  overall AHI 0.0 events per hour. Total sleep time was 6.5 hr. Primary snoring. There is insufficient supine study time to assess the  severity of positional obstructive sleep apnea (ELPIDIO). Referred to Sleep Disorder Clinic for further evaluation and management.  ENT evaluation for snoring. If suspicion for sleep disorder breathing is high then repeat testing is indicated. Clinical notes indicate  patient has cycler physiological insomnia. The appropriate study in this case would be an in-lab polysomnography.  CLINICAL HISTORY: 40 year old female presented with: 14.5 inch neck, BMI of 27, an [place] sleepiness score of 2, history  of depression and symptoms of nocturnal snoring, waking up choking and witnessed apneas. Based on the clinical history, the patient  has a high pre-test probability of having mild ELPIDIO.

## 2020-07-27 RX ORDER — VALACYCLOVIR HYDROCHLORIDE 500 MG/1
500 TABLET, FILM COATED ORAL 2 TIMES DAILY
Qty: 6 TABLET | Refills: 0 | Status: SHIPPED | OUTPATIENT
Start: 2020-07-27 | End: 2020-08-26

## 2020-08-03 ENCOUNTER — OFFICE VISIT (OUTPATIENT)
Dept: PULMONOLOGY | Facility: CLINIC | Age: 40
End: 2020-08-03
Payer: MEDICAID

## 2020-08-03 VITALS
BODY MASS INDEX: 28.01 KG/M2 | SYSTOLIC BLOOD PRESSURE: 118 MMHG | RESPIRATION RATE: 16 BRPM | HEIGHT: 67 IN | WEIGHT: 178.44 LBS | OXYGEN SATURATION: 98 % | HEART RATE: 72 BPM | DIASTOLIC BLOOD PRESSURE: 60 MMHG

## 2020-08-03 DIAGNOSIS — Z87.891 FORMER CIGARETTE SMOKER: Chronic | ICD-10-CM

## 2020-08-03 DIAGNOSIS — F41.9 ANXIETY AND DEPRESSION: ICD-10-CM

## 2020-08-03 DIAGNOSIS — R06.83 PRIMARY SNORING: Primary | ICD-10-CM

## 2020-08-03 DIAGNOSIS — F32.A ANXIETY AND DEPRESSION: ICD-10-CM

## 2020-08-03 DIAGNOSIS — J45.20 MILD INTERMITTENT ASTHMA WITHOUT COMPLICATION: ICD-10-CM

## 2020-08-03 DIAGNOSIS — F51.04 PSYCHOPHYSIOLOGICAL INSOMNIA: ICD-10-CM

## 2020-08-03 PROCEDURE — 99999 PR PBB SHADOW E&M-EST. PATIENT-LVL III: ICD-10-PCS | Mod: PBBFAC,,, | Performed by: NURSE PRACTITIONER

## 2020-08-03 PROCEDURE — 99999 PR PBB SHADOW E&M-EST. PATIENT-LVL III: CPT | Mod: PBBFAC,,, | Performed by: NURSE PRACTITIONER

## 2020-08-03 PROCEDURE — 99204 OFFICE O/P NEW MOD 45 MIN: CPT | Mod: S$PBB,,, | Performed by: NURSE PRACTITIONER

## 2020-08-03 PROCEDURE — 99213 OFFICE O/P EST LOW 20 MIN: CPT | Mod: PBBFAC | Performed by: NURSE PRACTITIONER

## 2020-08-03 PROCEDURE — 99204 PR OFFICE/OUTPT VISIT, NEW, LEVL IV, 45-59 MIN: ICD-10-PCS | Mod: S$PBB,,, | Performed by: NURSE PRACTITIONER

## 2020-08-03 NOTE — ASSESSMENT & PLAN NOTE
Intermittent, light snoring. No obstructive sleep apnea.   Home Sleep Study 7/21/2020 No sleep apnea detected. AHI 0.0.   PSG 12/5/2015 No obstructive sleep apnea AHI 0.0.   If treatment desired for snoring may try otc theravent nasal strips, follow up with ENT or see dentist for oral appliance.   Patient states not interested in treatment.

## 2020-08-03 NOTE — PROGRESS NOTES
Subjective:      Patient ID: Jeannine Steiner is a 40 y.o. female.    Patient Active Problem List   Diagnosis    Asthma    Anxiety and depression    Abdominal pain, acute    Chronic back pain    Chronic low back pain without sciatica    Personal history of slipped capital femoral epiphysis (SCFE)    History of herpes genitalis    Acoustic neuroma    Preop cardiovascular exam    Former cigarette smoker    Primary snoring       she has been referred by Amanda Lloyd, NP for evaluation and management for   Chief Complaint   Patient presents with    Sleep Apnea     Chief Complaint: Sleep Apnea    HPI:  She presents for review of Home Sleep Study ordered by Amanda Lolyd to evaluate patient's complaint of fatigue.   Home Sleep Study 7/21/2020 No sleep apnea detected. AHI 0.0.   Patient has prior evaluation for obstructive sleep apnea in 2015 when she had difficulty with insomnia seen by Dr. Olmstead. PSG 12/5/2015 no obstructive sleep apnea AHI 0.0.     Patient reports she no longer has trouble sleeping at night.   She was followed by psychiatry, Dr. Pauline Moreno for anxiety with depression and prior diagnosis of insomnia.     Patient has observed with light intermittent snoring and one time a family member reported she saw patient take a little time to take a breath no consistent observation of apnea by family.   She denies day time napping.  She denies recent weight gain, lost 28 lbs since March 2020, total of 40 lbs since 2017 with change of diet and adding exercise.   Cardiovascular risk factors: none  Bed time is 1200MN  Wake time is 0800am  Sleep onset is within  15 -30 Minutes.  Sleep maintenance difficulties related to none  Wake after sleep onset occurs none to one.  Nocturia occurs none to one.    Sleep aids :  NO  Dry mouth :  NO  Sleep walking:  NO  Sleep talking :  NO  Sleep eating: NO  Vivid Dreams :  NO  Cataplexy :  NO    [place] sleepiness score was 1.  Neck circumference is  (14.5 inches).  Mallampati score 3    STOP - BANG Questionnaire:   1. Snoring : Do you snore loudly ?    NO  2. Tired : Do you often feel tired, fatigued, or sleepy during daytime?   YES, tired not sleepy.   3. Observed: Has anyone observed you stop breathing during your sleep?    NO  4. Blood pressure : Do you have or are you being treated for high blood pressure?   NO  5. BMI :BMI more than 35 kg/m2?   NO  6. Age : Age over 50 yr old?   NO  7. Neck circumference: Neck circumference greater than 40 cm?   NO  8. Gender: Gender male?   NO    SCORE: 1    High risk of ELPIDIO: Yes 5 - 8  Intermediate risk of ELPIDIO: Yes 3 - 4  Low risk of ELPIDIO: Yes 0 - 2    Occupational History:   Employed full time as  Chief (self employed) and  (self employed) .     Previous Report Reviewed: lab reports and office notes     Past Medical History: The following portions of the patient's history were reviewed and updated as appropriate:   She  has a past surgical history that includes Slipped capital femoral epiphysis pinning (Bilateral, 1989, 1990); Total hip arthroplasty (Right, 1/2013); Hysterectomy (06/25/2014); and Neuroma surgery (Left).  Her family history includes Arthritis in her maternal aunt, maternal aunt, and maternal grandmother; Colon cancer in her maternal uncle; Diabetes in her maternal grandfather; Hypertension in her paternal grandmother.  She  reports that she quit smoking about 2 weeks ago. Her smoking use included cigarettes. She started smoking about 23 years ago. She has a 23.00 pack-year smoking history. She has never used smokeless tobacco. She reports current alcohol use. She reports that she does not use drugs.  She has a current medication list which includes the following prescription(s): carboxymethylcellulose, clonazepam, nicotine, nicotine (polacrilex), oxycodone-acetaminophen, quetiapine, and valacyclovir.  She is allergic to pcn [penicillins]..    Review of Systems   Constitutional: Positive for  "fatigue. Negative for fever, chills, weight loss, weight gain, activity change, appetite change and night sweats.   HENT: Negative for postnasal drip, rhinorrhea, sinus pressure, voice change and congestion.    Eyes: Negative for redness and itching.   Respiratory: Negative for snoring, cough, sputum production, chest tightness, shortness of breath, wheezing, orthopnea, asthma nighttime symptoms, dyspnea on extertion, use of rescue inhaler and somnolence.    Cardiovascular: Negative.  Negative for chest pain, palpitations and leg swelling.   Genitourinary: Negative for difficulty urinating and hematuria.   Endocrine: Negative for cold intolerance and heat intolerance.    Musculoskeletal: Negative for arthralgias, gait problem, joint swelling and myalgias.   Skin: Negative.    Gastrointestinal: Negative for nausea, vomiting, abdominal pain and acid reflux.   Neurological: Negative for dizziness, weakness, light-headedness and headaches.   Hematological: Negative for adenopathy. No excessive bruising.   All other systems reviewed and are negative.     Objective:   /60   Pulse 72   Resp 16   Ht 5' 7" (1.702 m)   Wt 81 kg (178 lb 7.4 oz)   SpO2 98%   BMI 27.95 kg/m²   Physical Exam  Vitals signs and nursing note reviewed.   Constitutional:       General: She is not in acute distress.     Appearance: She is well-developed. She is not ill-appearing or toxic-appearing.   HENT:      Head: Normocephalic.      Right Ear: External ear normal.      Left Ear: External ear normal.      Nose: Nose normal.      Mouth/Throat:      Pharynx: No oropharyngeal exudate.   Eyes:      Conjunctiva/sclera: Conjunctivae normal.   Neck:      Musculoskeletal: Normal range of motion and neck supple.   Cardiovascular:      Rate and Rhythm: Normal rate and regular rhythm.      Heart sounds: Normal heart sounds.   Pulmonary:      Effort: Pulmonary effort is normal.      Breath sounds: Normal breath sounds. No stridor.   Abdominal:      " Palpations: Abdomen is soft.   Musculoskeletal: Normal range of motion.   Lymphadenopathy:      Cervical: No cervical adenopathy.   Skin:     General: Skin is warm and dry.   Neurological:      Mental Status: She is alert and oriented to person, place, and time.   Psychiatric:         Behavior: Behavior normal. Behavior is cooperative.         Thought Content: Thought content normal.         Judgment: Judgment normal.         Personal Diagnostic Review  Review of labs, xray's, cardiology reports.     7/21/2020 Home Sleep Study   Clinically significant sleep disordered breathing is not identified. Overall AHI 0.0 events per hour. Total sleep time was 6.5 hr. Primary snoring. There is insufficient supine study time to assess the severity of positional obstructive sleep apnea (ELPIDIO). [facility] for further evaluation and management.     12/5/2015 PSG  The diagnostic polysomnography revealed no diagnosable obstructive sleep apnea / hypopnea syndrome (no obstructive,  central or mixed apneas, no hypopneas, and no RERAs for the study), AHI 0.0  with a mean SpO2 value of 97.6 %, mild, minimum  oxygen saturation during sleep of 95.0 %, and waking baseline SpO2 = 99 %. Rare snoring was noted. CPAP is unlikely to  help this patients sleep very much because respiratory events and related arousals were absent, sleep quality was adequate    Assessment:     1. Primary snoring    2. Mild intermittent asthma without complication    3. Anxiety and depression    4. Former cigarette smoker    5. Psychophysiological insomnia      No orders of the defined types were placed in this encounter.    Plan:   Discussed diagnosis, its evaluation, treatment and usual course. All questions answered.  Problem List Items Addressed This Visit     RESOLVED: Psychophysiological insomnia     Resolved in 2020   Followed by psychiatry          Primary snoring - Primary     Intermittent, light snoring. No obstructive sleep apnea.    Home Sleep Study 7/21/2020 No sleep apnea detected. AHI 0.0.   PSG 12/5/2015 No obstructive sleep apnea AHI 0.0.   If treatment desired for snoring may try otc theravent nasal strips, follow up with ENT or see dentist for oral appliance.   Patient states not interested in treatment.            Former cigarette smoker (Chronic)     Quit July 2020. 23 pack years.            Asthma     Diagnosis as child, no breathing problems or asthma symptoms since pregnancy in 1998.            Anxiety and depression     Followed by Dr. Pauline Moreno, psychiatry.  Klonopin 1mg  Off seroquel since side effect tired and sleepy.              Plan summary:   Patient is low risk for obstructive sleep apnea, no present indication for repeat sleep study. PSG 2015 and Home Sleep Study 2020 no obstructive sleep apnea. Patient not interested repeat PSG or additional nights Home Sleep Study at this time.     Follow up if new onset of symptoms or if re-evaluation indicated..      Thank you for the opportunity to participate in the care of this patient.

## 2020-08-03 NOTE — ASSESSMENT & PLAN NOTE
Followed by Dr. Pauline Moreno, psychiatry.  Klonopin 1mg  Off seroquel since side effect tired and sleepy.

## 2020-08-03 NOTE — LETTER
August 3, 2020      Amanda Lloyd NP  [address]  [address]st [address]  [facility] [address]           [facility]  [address] [place] [id number]  Phone: [phone]  Fax: [phone]          Patient: Jeannine Steiner   MR Number: [id number]   YOB: 1980   Date of Visit: 8/3/2020       Dear Amanda Lloyd:    Thank you for referring Jeannine Steiner to me for evaluation. Attached you will find relevant portions of my assessment and plan of care.    If you have questions, please do not hesitate to call me. I look forward to following Jeannine Steiner along with you.    Sincerely,    Lola Robbins NP    Enclosure  CC:  No Recipients    If you would like to receive this communication electronically, please contact [email or web address] or [phone] to request more information on [employer] access.    For providers and/or their staff who would like to refer a patient to [facility], please contact us through our one-stop-shop provider referral line, [facility], at [phone].    If you feel you have received this communication in error or would no longer like to receive these types of communications, please e-mail [email or web address]

## 2020-08-13 ENCOUNTER — TELEPHONE (OUTPATIENT)
Dept: FAMILY MEDICINE | Facility: CLINIC | Age: 40
End: 2020-08-13

## 2020-08-13 NOTE — TELEPHONE ENCOUNTER
Spoke with pt, she states she was calling to see about getting a medication for her facial nerve pain. She states that her ENT, Dr. Gonzalez told her to contact PCP since the surgery with him was in 2018. She states that she has tried taking Gabapentin from [facility] doctor but it did not help.  Please advise?

## 2020-08-13 NOTE — TELEPHONE ENCOUNTER
Spoke with pt, informed her that she will need to contact [facility] since Dr. Siegel does not treat chronic facial pain. She verbalized understanding.

## 2020-08-18 ENCOUNTER — OFFICE VISIT (OUTPATIENT)
Dept: NEUROLOGY | Facility: CLINIC | Age: 40
End: 2020-08-18
Attending: FAMILY MEDICINE
Payer: MEDICAID

## 2020-08-18 VITALS
SYSTOLIC BLOOD PRESSURE: 112 MMHG | WEIGHT: 183.44 LBS | HEART RATE: 72 BPM | BODY MASS INDEX: 28.79 KG/M2 | HEIGHT: 67 IN | RESPIRATION RATE: 16 BRPM | DIASTOLIC BLOOD PRESSURE: 76 MMHG

## 2020-08-18 DIAGNOSIS — R41.3 MEMORY LOSS: ICD-10-CM

## 2020-08-18 DIAGNOSIS — G89.29 CHRONIC LOW BACK PAIN WITHOUT SCIATICA, UNSPECIFIED BACK PAIN LATERALITY: ICD-10-CM

## 2020-08-18 DIAGNOSIS — Z98.890 STATUS POST EXCISION OF ACOUSTIC NEUROMA: ICD-10-CM

## 2020-08-18 DIAGNOSIS — Z86.19 HISTORY OF HERPES GENITALIS: ICD-10-CM

## 2020-08-18 DIAGNOSIS — M54.50 CHRONIC BILATERAL LOW BACK PAIN, UNSPECIFIED WHETHER SCIATICA PRESENT: ICD-10-CM

## 2020-08-18 DIAGNOSIS — J45.20 MILD INTERMITTENT ASTHMA WITHOUT COMPLICATION: ICD-10-CM

## 2020-08-18 DIAGNOSIS — Z86.018 STATUS POST EXCISION OF ACOUSTIC NEUROMA: ICD-10-CM

## 2020-08-18 DIAGNOSIS — D33.3 ACOUSTIC NEUROMA: ICD-10-CM

## 2020-08-18 DIAGNOSIS — F32.A ANXIETY AND DEPRESSION: ICD-10-CM

## 2020-08-18 DIAGNOSIS — H90.42 SENSORINEURAL HEARING LOSS, UNILATERAL, LEFT EAR, WITH UNRESTRICTED HEARING ON THE CONTRALATERAL SIDE: ICD-10-CM

## 2020-08-18 DIAGNOSIS — R41.3 MEMORY DIFFICULTIES: Primary | ICD-10-CM

## 2020-08-18 DIAGNOSIS — M54.50 CHRONIC LOW BACK PAIN WITHOUT SCIATICA, UNSPECIFIED BACK PAIN LATERALITY: ICD-10-CM

## 2020-08-18 DIAGNOSIS — Z87.39 PERSONAL HISTORY OF SLIPPED CAPITAL FEMORAL EPIPHYSIS (SCFE): ICD-10-CM

## 2020-08-18 DIAGNOSIS — G89.29 CHRONIC BILATERAL LOW BACK PAIN, UNSPECIFIED WHETHER SCIATICA PRESENT: ICD-10-CM

## 2020-08-18 DIAGNOSIS — F41.9 ANXIETY AND DEPRESSION: ICD-10-CM

## 2020-08-18 DIAGNOSIS — R06.83 PRIMARY SNORING: ICD-10-CM

## 2020-08-18 DIAGNOSIS — Z87.891 FORMER CIGARETTE SMOKER: Chronic | ICD-10-CM

## 2020-08-18 PROCEDURE — 99204 OFFICE O/P NEW MOD 45 MIN: CPT | Mod: S$PBB,,, | Performed by: PSYCHIATRY & NEUROLOGY

## 2020-08-18 PROCEDURE — 99999 PR PBB SHADOW E&M-EST. PATIENT-LVL IV: ICD-10-PCS | Mod: PBBFAC,,, | Performed by: PSYCHIATRY & NEUROLOGY

## 2020-08-18 PROCEDURE — 99204 PR OFFICE/OUTPT VISIT, NEW, LEVL IV, 45-59 MIN: ICD-10-PCS | Mod: S$PBB,,, | Performed by: PSYCHIATRY & NEUROLOGY

## 2020-08-18 PROCEDURE — 99999 PR PBB SHADOW E&M-EST. PATIENT-LVL IV: CPT | Mod: PBBFAC,,, | Performed by: PSYCHIATRY & NEUROLOGY

## 2020-08-18 PROCEDURE — 99214 OFFICE O/P EST MOD 30 MIN: CPT | Mod: PBBFAC | Performed by: PSYCHIATRY & NEUROLOGY

## 2020-08-18 RX ORDER — LANOLIN ALCOHOL/MO/W.PET/CERES
100 CREAM (GRAM) TOPICAL DAILY
COMMUNITY

## 2020-08-18 NOTE — LETTER
August 18, 2020      Evelin Teague MD  [address] [facility] [address]           [facility]  [address] [place] [id number]  Phone: [phone]  Fax: [phone]          Patient: Jeannine Steiner   MR Number: [id number]   YOB: 1980   Date of Visit: 8/18/2020       Dear Dr. Evelin Teague:    Thank you for referring Jeannine Steiner to me for evaluation. Attached you will find relevant portions of my assessment and plan of care.    If you have questions, please do not hesitate to call me. I look forward to following Jeannine Steiner along with you.    Sincerely,    Bri Rachel MD    Enclosure  CC:  No Recipients    If you would like to receive this communication electronically, please contact [email or web address] or [phone] to request more information on [employer] access.    For providers and/or their staff who would like to refer a patient to [facility], please contact us through our one-stop-shop provider referral line, [facility], at [phone].    If you feel you have received this communication in error or would no longer like to receive these types of communications, please e-mail [email or web address]

## 2020-08-18 NOTE — PROGRESS NOTES
Subjective:       Patient ID: Jeannine Steiner is a 40 y.o. female.    Chief Complaint: Memory Loss          HPI     The patient I here for memory loss. The patient is presenting with 2-year history of memory loss.The main problems the patient has are related to lapses of recent events. For example, the patient would forget a recent conversation or things to do. The patient does not excessively forget where placed certain things. The patient is not forgetting names. The patient is driving and denies getting lost. The patient is not losing personal items and does not put them in odd places. No confusion around and inside the house. No trouble remembering the date and time, keeping up with medications and appointments and keeping up with major holidays and political changes. The patient is independent in handling finances.  Works as a . The patient is independent with ADLs. No hallucinations or delusions. No seizures. No behavioral problems. No language problems. No problems handling tools. No history of strokes. No history of headaches. No history of hypothyroidism. No history of alcoholism. No history of B12 deficiency. Positive history of depression. No history of Syphilis.  No history of HIV infection. No toxic exposures.  No history of traumatic brain injury. No tremors or abnormal movements. No falls or instability. No urinary incontinence. No change in sleep and appetite. No family history of dementia. Neurological history is remarkable for LT vestibular schwannoma resection in 2018 complicated by LT facial palsy. Labs from [id number] show NL TFT, B12, FA with RPR, HIV -ve. Had multiple Brain MRIs [id number] that unremarkable.       Review of Systems   Constitutional: Negative for appetite change and fatigue.   HENT: Positive for hearing loss. Negative for tinnitus.    Eyes: Negative for photophobia and visual disturbance.   Respiratory: Negative for apnea and shortness of breath.    Cardiovascular:  Negative for chest pain and palpitations.   Gastrointestinal: Negative for nausea and vomiting.   Endocrine: Negative for cold intolerance and heat intolerance.   Genitourinary: Negative for difficulty urinating and urgency.   Musculoskeletal: Negative for arthralgias, back pain, gait problem, joint swelling, myalgias, neck pain and neck stiffness.   Skin: Negative for color change and rash.   Allergic/Immunologic: Negative for environmental allergies and immunocompromised state.   Neurological: Positive for facial asymmetry. Negative for dizziness, tremors, seizures, syncope, speech difficulty, weakness, light-headedness, numbness and headaches.   Hematological: Negative for adenopathy. Does not bruise/bleed easily.   Psychiatric/Behavioral: Positive for dysphoric mood. Negative for agitation, behavioral problems, confusion, decreased concentration, hallucinations, self-injury, sleep disturbance and suicidal ideas. The patient is nervous/anxious. The patient is not hyperactive.                  Current Outpatient Medications:     carboxymethylcellulose (REFRESH LIQUIGEL) 1 % ophthalmic solution, Place 2 drops into the left eye 4 (four) times daily., Disp: , Rfl:     clonazePAM (KLONOPIN) 1 MG tablet, TK 1 T PO BID, Disp: 60 tablet, Rfl: 0    cyanocobalamin (VITAMIN B-12) 1000 MCG tablet, Take 100 mcg by mouth once daily., Disp: , Rfl:     FLAXSEED ORAL, Take by mouth., Disp: , Rfl:     mv-mn-iron-FA-Ca carb-vit K (WOMEN'S MULTIVITAMIN) 18 mg iron-400 mcg-500 mg Tab, Take by mouth., Disp: , Rfl:     oxycodone-acetaminophen (PERCOCET)  mg per tablet, TK 1 T PO Q 6 H PRN, Disp: , Rfl: 0    QUEtiapine (SEROQUEL) 50 MG tablet, as needed. , Disp: , Rfl:     valACYclovir (VALTREX) 500 MG tablet, Take 1 tablet (500 mg total) by mouth 2 (two) times daily., Disp: 6 tablet, Rfl: 0  Past Medical History:   Diagnosis Date    Anxiety and depression     Asthma     Chronic hip pain     Neuroma 05/15/2018    left  ear    Osteoarthritis of hip 2012     Past Surgical History:   Procedure Laterality Date    HYSTERECTOMY  2014    hys only - pain/bleeding    NEUROMA SURGERY Left     SLIPPED CAPITAL FEMORAL EPIPHYSIS PINNING Bilateral ,     pins placed    TOTAL HIP ARTHROPLASTY Right 2013     Social History     Socioeconomic History    Marital status: Single     Spouse name: Not on file    Number of children: Not on file    Years of education: Not on file    Highest education level: Not on file   Occupational History     Employer: [employer]   Social Needs    Financial resource strain: Not on file    Food insecurity     Worry: Not on file     Inability: Not on file    Transportation needs     Medical: Not on file     Non-medical: Not on file   Tobacco Use    Smoking status: Former Smoker     Packs/day: 1.00     Years: 23.00     Pack years: 23.00     Types: Cigarettes     Start date:      Quit date: 2020     Years since quittin.0    Smokeless tobacco: Never Used   Substance and Sexual Activity    Alcohol use: Yes     Alcohol/week: 0.0 standard drinks     Frequency: Monthly or less    Drug use: No    Sexual activity: Never   Lifestyle    Physical activity     Days per week: Not on file     Minutes per session: Not on file    Stress: Not on file   Relationships    Social connections     Talks on phone: Not on file     Gets together: Not on file     Attends Restorationism service: Not on file     Active member of club or organization: Not on file     Attends meetings of clubs or organizations: Not on file     Relationship status: Not on file   Other Topics Concern    Not on file   Social History Narrative    Not on file             Past/Current Medical/Surgical History, Past/Current Social History, Past/Current Family History and Past/Current Medications were reviewed in detail.        Objective:           VITAL SIGNS WERE REVIEWED      GENERAL APPEARANCE:     The patient looks  comfortable.    Body habitus is slightly overweight.     No signs of respiratory distress.    Normal breathing pattern.    No dysmorphic features    Normal eye contact.     GENERAL MEDICAL EXAM:    HEENT:  Head is atraumatic normocephalic.     No tender temporal arteries. Fundoscopic (Ophthalmoscopic) exam showed no disc edema.      Neck and Axillae: No JVD. No visible lesions.    No carotid bruits. No thyromegaly. No lymphadenopathy.    Cardiopulmonary: No cyanosis. No tachypnea. Normal respiratory effort.    Clear breath sounds. S1, S2 with regular rhythm . No murmurs.     Gastrointestinal/Urogenital:  No jaundice. No stomas or lesions. No visible hernias. No catheters.     Abdomen is soft non-tender. No masses or organomegaly.    Skin, Hair and Nails: No pathognonomic skin rash. No neurofibromatosis. No visible lesions.No stigmata of autoimmune disease. No clubbing.    Skin is warm and moist. No palpable masses.    Limbs: No varicose veins. No visible swelling.    No palpable edema. Pulses are symmetric. Pedal pulses are palpable.      Muskoskeletal: No visible deformities.No visible lesions.    No spine tenderness. No signs of longstanding neuropathy. No dislocations or fractures.            Neurologic Exam     Mental Status   Oriented to person, place, and time.   Registration: recalls 3 of 3 objects. Recall at 5 minutes: recalls 3 of 3 objects. Follows 3 step commands.   Attention: normal. Concentration: normal.   Speech: speech is normal   Level of consciousness: alert  Knowledge: good and consistent with education. Able to perform simple calculations.   Able to name object. Able to read. Able to repeat. Able to write. Normal comprehension.     MOCA 30    Visuospatial/Executive  5   Naming                          3  Attention                        6  Language                      3  Abstraction                    2  Recall                            5  Orientation                     6    The patient is  fully aware of recent events.    Processing and cognitive speed is excellent.           Cranial Nerves     CN II   Visual fields full to confrontation.   Visual acuity: normal  Right visual field deficit: none  Left visual field deficit: none     CN III, IV, VI   Pupils are equal, round, and reactive to light.  Extraocular motions are normal.   Right pupil: Size: 2 mm. Shape: regular. Reactivity: brisk. Consensual response: intact. Accommodation: intact.   Left pupil: Size: 2 mm. Shape: regular. Reactivity: brisk. Consensual response: intact. Accommodation: intact.   CN III: no CN III palsy  CN VI: no CN VI palsy  Nystagmus: none   Diplopia: none  Ophthalmoparesis: none  Upgaze: normal  Downgaze: normal  Conjugate gaze: present  Vestibulo-ocular reflex: present    CN V   Right facial sensation deficit: none  Left facial sensation deficit: complete  Right corneal reflex: normal  Left corneal reflex: normal    CN VII   Right facial weakness: none  Left facial weakness: peripheral  Right taste: normal  Left taste: normal    CN VIII   Hearing: impaired    CN IX, X   CN IX normal.   CN X normal.   Palate: symmetric    CN XI   CN XI normal.   Right sternocleidomastoid strength: normal  Left sternocleidomastoid strength: normal  Right trapezius strength: normal  Left trapezius strength: normal    CN XII   CN XII normal.   Tongue: not atrophic  Fasciculations: absent  Tongue deviation: none    Motor Exam   Muscle bulk: normal  Overall muscle tone: normal  Right arm tone: normal  Left arm tone: normal  Right arm pronator drift: absent  Left arm pronator drift: absent  Right leg tone: normal  Left leg tone: normal    Strength   Strength 5/5 throughout.   Right neck flexion: 5/5  Left neck flexion: 5/5  Right neck extension: 5/5  Left neck extension: 5/5  Right deltoid: 5/5  Left deltoid: 5/5  Right biceps: 5/5  Left biceps: 5/5  Right triceps: 5/5  Left triceps: 5/5  Right wrist flexion: 5/5  Left wrist flexion: 5/5  Right  wrist extension: 5/5  Left wrist extension: 5/5  Right interossei: 5/5  Left interossei: 5/5  Right abdominals: 5/5  Left abdominals: 5/5  Right iliopsoas: 5/5  Left iliopsoas: 5/5  Right quadriceps: 5/5  Left quadriceps: 5/5  Right hamstrin/5  Left hamstrin/5  Right glutei: 5/5  Left glutei: 5/5  Right anterior tibial: 5/5  Left anterior tibial: 5/5  Right posterior tibial: 5/5  Left posterior tibial: 5/5  Right peroneal: 5/5  Left peroneal: 5/5  Right gastroc: 5/5  Left gastroc: 5/5    Sensory Exam   Light touch normal.   Right arm light touch: normal  Left arm light touch: normal  Right leg light touch: normal  Left leg light touch: normal  Vibration normal.   Right arm vibration: normal  Left arm vibration: normal  Right leg vibration: normal  Left leg vibration: normal  Proprioception normal.   Right arm proprioception: normal  Left arm proprioception: normal  Right leg proprioception: normal  Left leg proprioception: normal  Pinprick normal.   Right arm pinprick: normal  Left arm pinprick: normal  Right leg pinprick: normal  Left leg pinprick: normal  Graphesthesia: normal  Stereognosis: normal    Gait, Coordination, and Reflexes     Gait  Gait: normal    Coordination   Romberg: negative  Finger to nose coordination: normal  Heel to shin coordination: normal  Tandem walking coordination: normal    Tremor   Resting tremor: absent  Intention tremor: absent  Action tremor: absent    Reflexes   Right brachioradialis: 2+  Left brachioradialis: 2+  Right biceps: 2+  Left biceps: 2+  Right triceps: 2+  Left triceps: 2+  Right patellar: 2+  Left patellar: 2+  Right achilles: 2+  Left achilles: 2+  Right : 2+  Left : 2+  Right plantar: normal  Left plantar: normal  Right Brumfield: absent  Left Brumfield: absent  Right ankle clonus: absent  Left ankle clonus: absent  Right pendular knee jerk: absent  Left pendular knee jerk: absent      Lab Results   Component Value Date    WBC 10.47 07/15/2020    HGB 14.1  07/15/2020    HCT 43.7 07/15/2020     (H) 07/15/2020     07/15/2020     Sodium   Date Value Ref Range Status   07/15/2020 136 136 - 145 mmol/L Final     Potassium   Date Value Ref Range Status   07/15/2020 3.9 3.5 - 5.1 mmol/L Final     Chloride   Date Value Ref Range Status   07/15/2020 105 95 - 110 mmol/L Final     CO2   Date Value Ref Range Status   07/15/2020 25 23 - 29 mmol/L Final     Glucose   Date Value Ref Range Status   07/15/2020 84 70 - 110 mg/dL Final     BUN, Bld   Date Value Ref Range Status   07/15/2020 13 6 - 20 mg/dL Final     Creatinine   Date Value Ref Range Status   07/15/2020 0.9 0.5 - 1.4 mg/dL Final     Calcium   Date Value Ref Range Status   07/15/2020 9.5 8.7 - 10.5 mg/dL Final     Total Protein   Date Value Ref Range Status   07/15/2020 8.3 6.0 - 8.4 g/dL Final     Albumin   Date Value Ref Range Status   07/15/2020 4.1 3.5 - 5.2 g/dL Final     Total Bilirubin   Date Value Ref Range Status   07/15/2020 0.7 0.1 - 1.0 mg/dL Final     Comment:     For infants and newborns, interpretation of results should be based  on gestational age, weight and in agreement with clinical  observations.  Premature Infant recommended reference ranges:  Up to 24 hours.............<8.0 mg/dL  Up to 48 hours............<12.0 mg/dL  3-5 days..................<15.0 mg/dL  6-29 days.................<15.0 mg/dL       Alkaline Phosphatase   Date Value Ref Range Status   07/15/2020 59 55 - 135 U/L Final     AST   Date Value Ref Range Status   07/15/2020 23 10 - 40 U/L Final     ALT   Date Value Ref Range Status   07/15/2020 20 10 - 44 U/L Final     Anion Gap   Date Value Ref Range Status   07/15/2020 6 (L) 8 - 16 mmol/L Final     eGFR if    Date Value Ref Range Status   07/15/2020 >60.0 >60 mL/min/1.73 m^2 Final     eGFR if non    Date Value Ref Range Status   07/15/2020 >60.0 >60 mL/min/1.73 m^2 Final     Comment:     Calculation used to obtain the estimated glomerular  filtration  rate (eGFR) is the CKD-EPI equation.        No results found for: [id number]  Lab Results   Component Value Date    TSH 0.475 07/15/2020    FREET4 1.06 08/25/2014 2016-2020     NL TFT, B12, FA with RPR, HIV -ve.     Multiple Brain MRIs [id number] that unremarkable.     Reviewed the neuroimaging independently       Assessment:       1. Memory difficulties    2. Memory loss    3. Former cigarette smoker    4. Mild intermittent asthma without complication    5. Anxiety and depression    6. Primary snoring    7. Acoustic neuroma    8. History of herpes genitalis    9. Personal history of slipped capital femoral epiphysis (SCFE)    10. Chronic low back pain without sciatica, unspecified back pain laterality    11. Chronic bilateral low back pain, unspecified whether sciatica present    12. Sensorineural hearing loss, unilateral, left ear, with unrestricted hearing on the contralateral side    13. Status post excision of acoustic neuroma        Plan:       MEMORY DIFFICULTIES WITH NORMAL COGNITIVE FUNCTION      The patient's cognitive function is completelynormal with excellent proceeding speed.    Had a long discussion with the patient that her memory problems could be attributed to decreased attention/concentration that could be caused by mood disorder, anxiety, insomnia, attention deficit disorder, pain and medications like benzodiazepines and narcotics.              MEDICAL/SURGICAL COMORBIDITIES     All relevant medical comorbidities noted and managed by primary care physician and medical care team.          MISCELLANEOUS MEDICAL PROBLEMS       HEALTHY LIFESTYLE AND PREVENTATIVE CARE    Encouraged the patient to adhere to the age-appropriate health maintenance guidelines including screening tests and vaccinations.     Discussed the overall importance of healthy lifestyle, optimal weight, exercise, healthy diet, good sleep hygiene and avoiding drugs including smoking, alcohol and recreational drugs. The  patient verbalized full understanding.       Advised the patient to follow COVID-19 prevention measures.       I spent 50 minutes face to face with the patient    More than 30 minutes of the time spent in counseling and coordination of care including discussions etiology of diagnosis (MEMORY AND ATTENTION), pathogenesis of diagnosis, prognosis of diagnosis,, diagnostic results, impression and recommendations, diagnostic studies, management, risks and benefits of treatment, instructions of disease self-management, treatment instructions, follow up requirements, patient and family counseling/involvement in care compliance with treatment regimen. All of the patient's questions were answered during this discussion.            Bri Rachel MD, FAAN    Attending Neurologist/Epileptologist         Diplomate, American Board of Psychiatry and Neurology    Diplomate, American Board of Clinical Neurophysiology     Fellow, American Academy of Neurology

## 2020-08-31 ENCOUNTER — CLINICAL SUPPORT (OUTPATIENT)
Dept: SMOKING CESSATION | Facility: CLINIC | Age: 40
End: 2020-08-31

## 2020-08-31 DIAGNOSIS — F17.200 NICOTINE DEPENDENCE: Primary | ICD-10-CM

## 2020-08-31 PROCEDURE — 99999 PR PBB SHADOW E&M-EST. PATIENT-LVL I: ICD-10-PCS | Mod: PBBFAC,,,

## 2020-08-31 PROCEDURE — 99402 PREV MED CNSL INDIV APPRX 30: CPT | Mod: S$GLB,,, | Performed by: GENERAL PRACTICE

## 2020-08-31 PROCEDURE — 99999 PR PBB SHADOW E&M-EST. PATIENT-LVL I: CPT | Mod: PBBFAC,,,

## 2020-08-31 PROCEDURE — 99402 PR PREVENT COUNSEL,INDIV,30 MIN: ICD-10-PCS | Mod: S$GLB,,, | Performed by: GENERAL PRACTICE

## 2020-08-31 RX ORDER — IBUPROFEN 200 MG
1 TABLET ORAL DAILY
Qty: 14 PATCH | Refills: 0 | Status: SHIPPED | OUTPATIENT
Start: 2020-08-31 | End: 2020-09-28 | Stop reason: SDUPTHER

## 2020-08-31 NOTE — PROGRESS NOTES
Individual Follow-Up Form    8/31/2020    Clinical Status of Patient: Outpatient    Length of Service: 30 minutes    Continuing Medication: yes  Patches     Target Symptoms: Withdrawal and medication side effects. The following were  rated moderate (3) to severe (4) on TCRS:  · Moderate (3): restless, irritable, desire tobacco  · Severe (4): none    Comments: Spoke with patient at length in regards to her smoking cessation progress. She stated that she has been having regular slips with smoking. She has been wearing nicotine patches that she purchased out of pocket and is smoking 3-4 cigarettes on top of the nicotine patch. She states that she has been having increased urges due to personal stress. Reviewed stress management and coping strategies. Reviewed proper use of the nicotine patches and the affects of too much nicotine. Discussed regular follow up appointments to assess progress. She verbalized understanding. The patient denies any abnormal behavioral or mental changes at this time. Will continue to encourage and monitor progress.    Diagnosis: F17.200    Next Visit: 1 week

## 2020-09-24 ENCOUNTER — CLINICAL SUPPORT (OUTPATIENT)
Dept: SMOKING CESSATION | Facility: CLINIC | Age: 40
End: 2020-09-24

## 2020-09-24 DIAGNOSIS — F17.200 NICOTINE DEPENDENCE: Primary | ICD-10-CM

## 2020-09-24 PROCEDURE — 99407 PR TOBACCO USE CESSATION INTENSIVE >10 MINUTES: ICD-10-PCS | Mod: S$GLB,,,

## 2020-09-24 PROCEDURE — 99407 BEHAV CHNG SMOKING > 10 MIN: CPT | Mod: S$GLB,,,

## 2020-09-24 NOTE — PROGRESS NOTES
Spoke with patient today in regard to smoking cessation progress for 3,6 month phone follow up on quit 1. Patient not tobacco she is not free at this time. Patient has scheduled an appointment to return to the program to continue to work on her Quit 1 episode. Informed patient of benefit period, future follow ups, and contact information if any further help or support is needed. Will  complete smart form for 3,6 month on  Quit attempt #1.

## 2020-09-28 ENCOUNTER — CLINICAL SUPPORT (OUTPATIENT)
Dept: SMOKING CESSATION | Facility: CLINIC | Age: 40
End: 2020-09-28

## 2020-09-28 DIAGNOSIS — F17.200 NICOTINE DEPENDENCE: Primary | ICD-10-CM

## 2020-09-28 DIAGNOSIS — F17.200 NICOTINE DEPENDENCE: ICD-10-CM

## 2020-09-28 PROCEDURE — 99999 PR PBB SHADOW E&M-EST. PATIENT-LVL I: ICD-10-PCS | Mod: PBBFAC,,,

## 2020-09-28 PROCEDURE — 99402 PR PREVENT COUNSEL,INDIV,30 MIN: ICD-10-PCS | Mod: S$GLB,,, | Performed by: GENERAL PRACTICE

## 2020-09-28 PROCEDURE — 99999 PR PBB SHADOW E&M-EST. PATIENT-LVL I: CPT | Mod: PBBFAC,,,

## 2020-09-28 PROCEDURE — 99402 PREV MED CNSL INDIV APPRX 30: CPT | Mod: S$GLB,,, | Performed by: GENERAL PRACTICE

## 2020-09-28 RX ORDER — MICONAZOLE NITRATE 2 %
2 CREAM (GRAM) TOPICAL
Qty: 200 EACH | Refills: 0 | Status: SHIPPED | OUTPATIENT
Start: 2020-09-28 | End: 2021-04-12

## 2020-09-28 RX ORDER — IBUPROFEN 200 MG
1 TABLET ORAL DAILY
Qty: 14 PATCH | Refills: 0 | Status: SHIPPED | OUTPATIENT
Start: 2020-09-28 | End: 2020-10-19 | Stop reason: DRUGHIGH

## 2020-09-28 NOTE — PROGRESS NOTES
Individual Follow-Up Form    9/28/2020    Clinical Status of Patient: Outpatient    Length of Service: 30 minutes    Continuing Medication: yes  Patches       Target Symptoms: Withdrawal and medication side effects. The following were  rated moderate (3) to severe (4) on TCRS:  · Moderate (3): Desire tobacco, anxious, and restless  · Severe (4): none    Comments: Spoke with patient by telephone.  Patient originally cancelled her scheduled clinic appointment due to no transportation.  She states that she has relapse and is interested with continuing with nicotine patches at this time.  She continues to smoke 3-5 cigarettes while wearing a nicotine patch.  Discussed using nicotine gum in place of smoking.  Discussed possible gum use and side affects.  She feels her smoking is emotional and habit.  The patient denies any abnormal behavioral or mental changes at this time. Will continue to monitor and encourage progress.      Diagnosis: F17.200    Next Visit: 1 week

## 2020-10-05 ENCOUNTER — CLINICAL SUPPORT (OUTPATIENT)
Dept: SMOKING CESSATION | Facility: CLINIC | Age: 40
End: 2020-10-05

## 2020-10-05 DIAGNOSIS — F17.200 NICOTINE DEPENDENCE: Primary | ICD-10-CM

## 2020-10-05 PROCEDURE — 99402 PREV MED CNSL INDIV APPRX 30: CPT | Mod: S$GLB,,, | Performed by: GENERAL PRACTICE

## 2020-10-05 PROCEDURE — 99402 PR PREVENT COUNSEL,INDIV,30 MIN: ICD-10-PCS | Mod: S$GLB,,, | Performed by: GENERAL PRACTICE

## 2020-10-05 NOTE — PROGRESS NOTES
Individual Follow-Up Form    10/5/2020    Clinical Status of Patient: Outpatient    Length of Service: 30 minutes    Continuing Medication: yes  Patches     Comments: Spoke with patient at length in regards to her smoking cessation progress. She states that she was able to go 3 days without smoking last week but relapsed when she experienced an intense urge to smoke. She stated that she went to the store on 10-1-2020 and bought a pack of cigarettes. She has 2 remaining cigarettes and states that she will try to not buy anymore cigarettes. Congratulated her on her progress. Reviewed coping strategies. The patient remains on the prescribed tobacco cessation medication regimen of 21 mg Nicoderm CQ daily and nicotine lozenges as needed without any negative side effects at this time. The patient denies any abnormal behavioral or mental changes at this time. Will continue to encourage and monitor progress.    Diagnosis: F17.200    Next Visit: 1 week

## 2020-10-14 ENCOUNTER — CLINICAL SUPPORT (OUTPATIENT)
Dept: SMOKING CESSATION | Facility: CLINIC | Age: 40
End: 2020-10-14

## 2020-10-14 DIAGNOSIS — F17.200 NICOTINE DEPENDENCE: Primary | ICD-10-CM

## 2020-10-14 PROCEDURE — 99404 PR PREVENT COUNSEL,INDIV,60 MIN: ICD-10-PCS | Mod: S$GLB,,, | Performed by: GENERAL PRACTICE

## 2020-10-14 PROCEDURE — 99404 PREV MED CNSL INDIV APPRX 60: CPT | Mod: S$GLB,,, | Performed by: GENERAL PRACTICE

## 2020-10-14 NOTE — PROGRESS NOTES
Individual Follow-Up Form    10/14/2020    Quit Date: 10-    Clinical Status of Patient: Outpatient    Length of Service: 60 minutes    Comments: Spoke with patient at length in regards to her smoking cessation progress. She has successfully reached her target quit goal and has not smoked in over 2 days. She continues to use the 21 mg nicotine patches daily with no adverse side effects noted. She is using the nicotine gum throughout the day as needed for intense urges. She stated that today she had a very stressful encounter and would have otherwise smoked but did not have any cigarettes with her so she used the gum instead. Discussed her progress and congratulated her. Her son still smokes but she has asked him to only smoke outside. She feels that after eating and when stressed is hard for her to refrain. Discussed coping strategies. The patient denies any abnormal behavioral or mental changes at this time. Will continue to encourage and monitor progress.    Diagnosis: F17.200    Next Visit: 1 week

## 2020-10-19 DIAGNOSIS — F17.200 NICOTINE DEPENDENCE: Primary | ICD-10-CM

## 2020-10-19 RX ORDER — IBUPROFEN 200 MG
1 TABLET ORAL DAILY
Qty: 14 PATCH | Refills: 1 | Status: SHIPPED | OUTPATIENT
Start: 2020-10-19 | End: 2021-04-12

## 2021-01-25 DIAGNOSIS — R41.3 MEMORY LOSS: Primary | ICD-10-CM

## 2021-01-25 DIAGNOSIS — D33.3 ACOUSTIC NEUROMA: ICD-10-CM

## 2021-01-25 DIAGNOSIS — F41.9 ANXIETY AND DEPRESSION: ICD-10-CM

## 2021-01-25 DIAGNOSIS — F32.A ANXIETY AND DEPRESSION: ICD-10-CM

## 2021-03-09 ENCOUNTER — TELEPHONE (OUTPATIENT)
Dept: SMOKING CESSATION | Facility: CLINIC | Age: 41
End: 2021-03-09

## 2021-03-31 ENCOUNTER — TELEPHONE (OUTPATIENT)
Dept: SMOKING CESSATION | Facility: CLINIC | Age: 41
End: 2021-03-31

## 2021-04-12 ENCOUNTER — TELEPHONE (OUTPATIENT)
Dept: FAMILY MEDICINE | Facility: CLINIC | Age: 41
End: 2021-04-12

## 2021-04-12 ENCOUNTER — CLINICAL SUPPORT (OUTPATIENT)
Dept: SMOKING CESSATION | Facility: CLINIC | Age: 41
End: 2021-04-12

## 2021-04-12 ENCOUNTER — OFFICE VISIT (OUTPATIENT)
Dept: FAMILY MEDICINE | Facility: CLINIC | Age: 41
End: 2021-04-12
Payer: MEDICAID

## 2021-04-12 VITALS
DIASTOLIC BLOOD PRESSURE: 70 MMHG | HEIGHT: 67 IN | OXYGEN SATURATION: 98 % | TEMPERATURE: 98 F | SYSTOLIC BLOOD PRESSURE: 110 MMHG | RESPIRATION RATE: 16 BRPM | WEIGHT: 181.88 LBS | BODY MASS INDEX: 28.55 KG/M2 | HEART RATE: 101 BPM

## 2021-04-12 DIAGNOSIS — F32.A ANXIETY AND DEPRESSION: ICD-10-CM

## 2021-04-12 DIAGNOSIS — F17.200 NICOTINE DEPENDENCE: Primary | ICD-10-CM

## 2021-04-12 DIAGNOSIS — S69.92XA FINGERNAIL INJURY, LEFT, INITIAL ENCOUNTER: Primary | ICD-10-CM

## 2021-04-12 DIAGNOSIS — F41.9 ANXIETY AND DEPRESSION: ICD-10-CM

## 2021-04-12 PROCEDURE — 99999 PR PBB SHADOW E&M-EST. PATIENT-LVL III: ICD-10-PCS | Mod: PBBFAC,,, | Performed by: FAMILY MEDICINE

## 2021-04-12 PROCEDURE — 99407 PR TOBACCO USE CESSATION INTENSIVE >10 MINUTES: ICD-10-PCS | Mod: S$GLB,,,

## 2021-04-12 PROCEDURE — 99213 PR OFFICE/OUTPT VISIT, EST, LEVL III, 20-29 MIN: ICD-10-PCS | Mod: S$PBB,,, | Performed by: FAMILY MEDICINE

## 2021-04-12 PROCEDURE — 99213 OFFICE O/P EST LOW 20 MIN: CPT | Mod: S$PBB,,, | Performed by: FAMILY MEDICINE

## 2021-04-12 PROCEDURE — 99407 BEHAV CHNG SMOKING > 10 MIN: CPT | Mod: S$GLB,,,

## 2021-04-12 PROCEDURE — 99999 PR PBB SHADOW E&M-EST. PATIENT-LVL III: CPT | Mod: PBBFAC,,, | Performed by: FAMILY MEDICINE

## 2021-04-12 PROCEDURE — 99213 OFFICE O/P EST LOW 20 MIN: CPT | Mod: PBBFAC,PO | Performed by: FAMILY MEDICINE

## 2021-04-12 RX ORDER — QUETIAPINE FUMARATE 100 MG/1
100 TABLET, FILM COATED ORAL NIGHTLY
COMMUNITY
Start: 2021-04-10

## 2021-04-12 RX ORDER — MELOXICAM 15 MG/1
15 TABLET ORAL DAILY
COMMUNITY
Start: 2021-03-24

## 2021-04-12 RX ORDER — HYDROXYZINE PAMOATE 25 MG/1
25 CAPSULE ORAL 2 TIMES DAILY
COMMUNITY
Start: 2021-03-12

## 2021-06-07 ENCOUNTER — TELEPHONE (OUTPATIENT)
Dept: FAMILY MEDICINE | Facility: CLINIC | Age: 41
End: 2021-06-07

## 2021-08-05 DIAGNOSIS — Z12.31 OTHER SCREENING MAMMOGRAM: ICD-10-CM

## 2021-08-25 ENCOUNTER — TELEPHONE (OUTPATIENT)
Dept: SMOKING CESSATION | Facility: CLINIC | Age: 41
End: 2021-08-25

## 2021-08-25 ENCOUNTER — CLINICAL SUPPORT (OUTPATIENT)
Dept: SMOKING CESSATION | Facility: CLINIC | Age: 41
End: 2021-08-25
Payer: MEDICAID

## 2021-08-25 DIAGNOSIS — F17.200 NICOTINE DEPENDENCE: Primary | ICD-10-CM

## 2021-08-25 PROCEDURE — 99407 BEHAV CHNG SMOKING > 10 MIN: CPT | Mod: S$PBB,,,

## 2021-08-25 PROCEDURE — 99407 PR TOBACCO USE CESSATION INTENSIVE >10 MINUTES: ICD-10-PCS | Mod: S$PBB,,,

## 2022-03-15 ENCOUNTER — TELEPHONE (OUTPATIENT)
Dept: SMOKING CESSATION | Facility: CLINIC | Age: 42
End: 2022-03-15
Payer: MEDICAID

## 2022-03-15 NOTE — TELEPHONE ENCOUNTER
Returned patient's phone call, to mobile number on file, in regards to scheduling a return Smoking Cessation Intake Clinic Appointment.  Spoke with patient and informed patient our  will be notified to call her and set up an appointment; patient approved.  Notified , hJonatan.

## 2022-04-08 ENCOUNTER — TELEPHONE (OUTPATIENT)
Dept: SMOKING CESSATION | Facility: CLINIC | Age: 42
End: 2022-04-08
Payer: MEDICAID

## 2022-04-08 NOTE — TELEPHONE ENCOUNTER
Attempted to call patient to reschedule their smoking cessation appointment. Left a message with my contact information to reschedule the appointment. Gudelia Caceres [phone].

## 2022-04-11 ENCOUNTER — TELEPHONE (OUTPATIENT)
Dept: SMOKING CESSATION | Facility: CLINIC | Age: 42
End: 2022-04-11
Payer: MEDICAID

## 2022-04-11 NOTE — TELEPHONE ENCOUNTER
Attempted to call patient to reschedule their smoking cessation appointment. Not able to leave a message with my contact information to reschedule the appointment. Gudelia Caceres [phone].

## 2022-05-13 ENCOUNTER — TELEPHONE (OUTPATIENT)
Dept: SMOKING CESSATION | Facility: CLINIC | Age: 42
End: 2022-05-13
Payer: MEDICAID

## 2022-05-13 NOTE — TELEPHONE ENCOUNTER
Attempted to call patient to reschedule her missed smoking cessation appointment. Left a message with my contact information Gudelia Caceres [phone].

## 2022-05-27 ENCOUNTER — TELEPHONE (OUTPATIENT)
Dept: SMOKING CESSATION | Facility: CLINIC | Age: 42
End: 2022-05-27
Payer: MEDICAID

## 2022-05-27 NOTE — TELEPHONE ENCOUNTER
Attempted to call patient to reschedule their smoking cessation appointment. Left a message with my contact information to reschedule the appointment. Gudelia Caceres [phone].

## 2022-09-29 NOTE — TELEPHONE ENCOUNTER
----- Message from Teresa Kenney sent at 3/20/2020  1:56 PM CDT -----  Contact: pt  .Type:  RX Refill Request    Who Called: SHUGART,CASEYHA WILLIE   Refill or New Rx: refill   RX Name and Strength: clonazePAM 1 mg  How is the patient currently taking it? (ex. 1XDay) clonazePAM twice daily   Is this a 30 day or 90 day RX: 30 day   Preferred Pharmacy with phone number    [id number] [place] [id number] [address] [address]  Phone: [phone] Fax: [phone]    Local or Mail Order:local   Ordering Provider:hetal  Would the patient rather a call back or a response via [facility]? call  Best Call Back Number: [phone] (home)     Additional Information: pt is requesting a call back from the nurse in regards to the pt knowing if the Dr will call in her meds from her psyph Dr because the  pt will not be able to see her dr until may            .Type:  RX Refill Request    Who Called: WALLYRTANGELITA WILLIE   Refill or New Rx: refill   RX Name and Strength: clonazePAM 1 mg  How is the patient currently taking it? (ex. 1XDay)   LEXAPRO1 daily  Is this a 30 day or 90 day RX: 30 day   Preferred Pharmacy with phone number    [facility] [id number] [place], [place] [id number] [address] [address]  Phone: [phone] Fax: [phone]    Local or Mail Order:local   Ordering Provider:hetal  Would the patient rather a call back or a response via [facility]? call  Best Call Back Number: [phone] (home)     Additional Information: pt is requesting a call back from the nurse in regards to the pt knowing if the Dr will call in her meds from her psyph Dr because the  pt will not be able to see her dr until may                 .Type:  RX Refill Request    Who Called: SHUGART,CASEYHA WILLIE   Refill or New Rx: refill   RX Name and Strength: clonazePAM 1 mg  How is the patient currently  taking it? (ex. 1XDay) SEROQUEL 1 nightly  Is this a 30 day or 90 day RX: 30 day   Preferred Pharmacy with phone number    [id number] - [place] [address] [place]  [place] [address]  Phone: [phone] Fax: [phone]    Local or Mail Order:local   Ordering Provider:hetal  Would the patient rather a call back or a response via [facility]? call  Best Call Back Number: [phone] (home)     Additional Information: pt is requesting a call back from the nurse in regards to the pt knowing if the Dr will call in her meds from her psyph Dr because the  pt will not be able to see her dr until may                  Biopsy Method: Dermablade

## 2022-10-21 DIAGNOSIS — Z12.31 OTHER SCREENING MAMMOGRAM: ICD-10-CM

## 2024-05-31 ENCOUNTER — TELEPHONE (OUTPATIENT)
Dept: PSYCHIATRY | Facility: CLINIC | Age: 44
End: 2024-05-31
Payer: MEDICAID

## 2024-05-31 NOTE — TELEPHONE ENCOUNTER
----- Message from Faith Cardona sent at 5/31/2024  8:15 AM CDT -----  Contact: Jeannine  .Patient is calling to speak with the nurse regarding referral  . Reports wanting to know the status  . Please give patient a call back at   [phone] (home)

## 2025-04-20 NOTE — TELEPHONE ENCOUNTER
----- Message from Altagracia Baker MA sent at 5/24/2018  2:22 PM CDT -----  Contact: Patient      ----- Message -----  From: Soila Mar  Sent: 5/24/2018  12:13 PM  To: Blake Waters Staff    Patient called to request a refill. She stated that the Anxiety medication isn't really working like it was in the beginning. She stated that the directions say to 1 tablet 2 times daily; but she has been taking 2 tablets 2 times daily and it works better.     She also just had surgery last week and can't drive so she can't come in to get the prescription. She would like it called into her pharmacy who sent in a request.    1. What is the name of the medication you are requesting? Lorezepam  2. What is the dose? 1 mg  3. How do you take the medication? Orally, topically, etc? orally  4. How often do you take this medication? 1 tablet 2 times daily.   5. Do you need a 30 day or 90 day supply? 30 day  6. How many refills are you requesting? 1  7. What is your preferred pharmacy and location of the pharmacy?     [facility] [address] [address]  Phone: [phone] Fax: [phone]    8. Who can we contact with further questions? [facility] [phone].    Thanks,  Soila         normal